# Patient Record
Sex: FEMALE | Race: WHITE | NOT HISPANIC OR LATINO | Employment: FULL TIME | URBAN - METROPOLITAN AREA
[De-identification: names, ages, dates, MRNs, and addresses within clinical notes are randomized per-mention and may not be internally consistent; named-entity substitution may affect disease eponyms.]

---

## 2018-02-09 ENCOUNTER — HOSPITAL ENCOUNTER (EMERGENCY)
Facility: HOSPITAL | Age: 23
Discharge: HOME | End: 2018-02-09
Attending: EMERGENCY MEDICINE | Admitting: EMERGENCY MEDICINE

## 2018-02-09 ENCOUNTER — APPOINTMENT (OUTPATIENT)
Dept: ULTRASOUND IMAGING | Facility: HOSPITAL | Age: 23
End: 2018-02-09

## 2018-02-09 VITALS
DIASTOLIC BLOOD PRESSURE: 75 MMHG | HEIGHT: 67 IN | BODY MASS INDEX: 44.42 KG/M2 | RESPIRATION RATE: 18 BRPM | SYSTOLIC BLOOD PRESSURE: 112 MMHG | OXYGEN SATURATION: 96 % | WEIGHT: 283 LBS | TEMPERATURE: 98 F | HEART RATE: 79 BPM

## 2018-02-09 DIAGNOSIS — R10.10 PAIN OF UPPER ABDOMEN: Primary | ICD-10-CM

## 2018-02-09 LAB
ALBUMIN SERPL-MCNC: 4.4 G/DL (ref 3.2–4.8)
ALBUMIN/GLOB SERPL: 1.8 G/DL (ref 1.5–2.5)
ALP SERPL-CCNC: 128 U/L (ref 25–100)
ALT SERPL W P-5'-P-CCNC: 29 U/L (ref 7–40)
ANION GAP SERPL CALCULATED.3IONS-SCNC: 7 MMOL/L (ref 3–11)
AST SERPL-CCNC: 26 U/L (ref 0–33)
B-HCG UR QL: NEGATIVE
BACTERIA UR QL AUTO: ABNORMAL /HPF
BASOPHILS # BLD AUTO: 0.02 10*3/MM3 (ref 0–0.2)
BASOPHILS NFR BLD AUTO: 0.2 % (ref 0–1)
BILIRUB SERPL-MCNC: 0.3 MG/DL (ref 0.3–1.2)
BILIRUB UR QL STRIP: NEGATIVE
BUN BLD-MCNC: 13 MG/DL (ref 9–23)
BUN/CREAT SERPL: 18.6 (ref 7–25)
CALCIUM SPEC-SCNC: 9.1 MG/DL (ref 8.7–10.4)
CHLORIDE SERPL-SCNC: 108 MMOL/L (ref 99–109)
CLARITY UR: ABNORMAL
CO2 SERPL-SCNC: 21 MMOL/L (ref 20–31)
COLOR UR: YELLOW
CREAT BLD-MCNC: 0.7 MG/DL (ref 0.6–1.3)
DEPRECATED RDW RBC AUTO: 40.6 FL (ref 37–54)
EOSINOPHIL # BLD AUTO: 0.16 10*3/MM3 (ref 0–0.3)
EOSINOPHIL NFR BLD AUTO: 1.5 % (ref 0–3)
ERYTHROCYTE [DISTWIDTH] IN BLOOD BY AUTOMATED COUNT: 12.8 % (ref 11.3–14.5)
GFR SERPL CREATININE-BSD FRML MDRD: 103 ML/MIN/1.73
GLOBULIN UR ELPH-MCNC: 2.5 GM/DL
GLUCOSE BLD-MCNC: 89 MG/DL (ref 70–100)
GLUCOSE UR STRIP-MCNC: NEGATIVE MG/DL
HCT VFR BLD AUTO: 42.9 % (ref 34.5–44)
HGB BLD-MCNC: 14.1 G/DL (ref 11.5–15.5)
HGB UR QL STRIP.AUTO: NEGATIVE
HOLD SPECIMEN: NORMAL
HOLD SPECIMEN: NORMAL
HYALINE CASTS UR QL AUTO: ABNORMAL /LPF
IMM GRANULOCYTES # BLD: 0.05 10*3/MM3 (ref 0–0.03)
IMM GRANULOCYTES NFR BLD: 0.5 % (ref 0–0.6)
INTERNAL NEGATIVE CONTROL: NEGATIVE
INTERNAL POSITIVE CONTROL: POSITIVE
KETONES UR QL STRIP: NEGATIVE
LEUKOCYTE ESTERASE UR QL STRIP.AUTO: NEGATIVE
LIPASE SERPL-CCNC: 33 U/L (ref 6–51)
LYMPHOCYTES # BLD AUTO: 2.37 10*3/MM3 (ref 0.6–4.8)
LYMPHOCYTES NFR BLD AUTO: 22.2 % (ref 24–44)
Lab: NORMAL
MCH RBC QN AUTO: 28.5 PG (ref 27–31)
MCHC RBC AUTO-ENTMCNC: 32.9 G/DL (ref 32–36)
MCV RBC AUTO: 86.8 FL (ref 80–99)
MONOCYTES # BLD AUTO: 0.83 10*3/MM3 (ref 0–1)
MONOCYTES NFR BLD AUTO: 7.8 % (ref 0–12)
NEUTROPHILS # BLD AUTO: 7.24 10*3/MM3 (ref 1.5–8.3)
NEUTROPHILS NFR BLD AUTO: 67.8 % (ref 41–71)
NITRITE UR QL STRIP: NEGATIVE
PH UR STRIP.AUTO: 7 [PH] (ref 5–8)
PLATELET # BLD AUTO: 261 10*3/MM3 (ref 150–450)
PMV BLD AUTO: 10.7 FL (ref 6–12)
POTASSIUM BLD-SCNC: 4.1 MMOL/L (ref 3.5–5.5)
PROT SERPL-MCNC: 6.9 G/DL (ref 5.7–8.2)
PROT UR QL STRIP: NEGATIVE
RBC # BLD AUTO: 4.94 10*6/MM3 (ref 3.89–5.14)
RBC # UR: ABNORMAL /HPF
REF LAB TEST METHOD: ABNORMAL
SODIUM BLD-SCNC: 136 MMOL/L (ref 132–146)
SP GR UR STRIP: 1.02 (ref 1–1.03)
SQUAMOUS #/AREA URNS HPF: ABNORMAL /HPF
UROBILINOGEN UR QL STRIP: ABNORMAL
WBC NRBC COR # BLD: 10.67 10*3/MM3 (ref 3.5–10.8)
WBC UR QL AUTO: ABNORMAL /HPF
WHOLE BLOOD HOLD SPECIMEN: NORMAL
WHOLE BLOOD HOLD SPECIMEN: NORMAL

## 2018-02-09 PROCEDURE — 81001 URINALYSIS AUTO W/SCOPE: CPT | Performed by: EMERGENCY MEDICINE

## 2018-02-09 PROCEDURE — 25010000002 ONDANSETRON PER 1 MG: Performed by: EMERGENCY MEDICINE

## 2018-02-09 PROCEDURE — 96361 HYDRATE IV INFUSION ADD-ON: CPT

## 2018-02-09 PROCEDURE — 85025 COMPLETE CBC W/AUTO DIFF WBC: CPT | Performed by: EMERGENCY MEDICINE

## 2018-02-09 PROCEDURE — 99284 EMERGENCY DEPT VISIT MOD MDM: CPT

## 2018-02-09 PROCEDURE — 76705 ECHO EXAM OF ABDOMEN: CPT

## 2018-02-09 PROCEDURE — 83690 ASSAY OF LIPASE: CPT | Performed by: EMERGENCY MEDICINE

## 2018-02-09 PROCEDURE — 96374 THER/PROPH/DIAG INJ IV PUSH: CPT

## 2018-02-09 PROCEDURE — 80053 COMPREHEN METABOLIC PANEL: CPT | Performed by: EMERGENCY MEDICINE

## 2018-02-09 RX ORDER — ONDANSETRON 2 MG/ML
4 INJECTION INTRAMUSCULAR; INTRAVENOUS ONCE
Status: COMPLETED | OUTPATIENT
Start: 2018-02-09 | End: 2018-02-09

## 2018-02-09 RX ORDER — SODIUM CHLORIDE 9 MG/ML
125 INJECTION, SOLUTION INTRAVENOUS CONTINUOUS
Status: DISCONTINUED | OUTPATIENT
Start: 2018-02-09 | End: 2018-02-09 | Stop reason: HOSPADM

## 2018-02-09 RX ORDER — SODIUM CHLORIDE 0.9 % (FLUSH) 0.9 %
10 SYRINGE (ML) INJECTION AS NEEDED
Status: DISCONTINUED | OUTPATIENT
Start: 2018-02-09 | End: 2018-02-09 | Stop reason: HOSPADM

## 2018-02-09 RX ORDER — CITALOPRAM 40 MG/1
40 TABLET ORAL DAILY
COMMUNITY
End: 2018-11-07

## 2018-02-09 RX ADMIN — SODIUM CHLORIDE 125 ML/HR: 9 INJECTION, SOLUTION INTRAVENOUS at 06:44

## 2018-02-09 RX ADMIN — SODIUM CHLORIDE 500 ML: 9 INJECTION, SOLUTION INTRAVENOUS at 06:13

## 2018-02-09 RX ADMIN — ONDANSETRON 4 MG: 2 INJECTION INTRAMUSCULAR; INTRAVENOUS at 06:15

## 2018-02-09 NOTE — DISCHARGE INSTRUCTIONS
Follow up with one of the [facility] physician groups below to setup primary care. If you have trouble following up, please call Sayda Torres, our transitional care nurse, at [phone].    (Dr. Shen, Dr. Arguelles, Dr. Olsen, and Dr. Vela.)  [facility], Primary Care, [phone], [address], [place] [address]    [facility], Primary Care, [phone], [address], [address] [place], [address] [facility]) [facility], Primary Care, [phone], [address] [place], [address] [facility], Primary Care, [phone], [address], [address] [place], [address]     [place] [facility], Primary Care, [phone], [address], [address] [place], [address]    [place] [address] [facility], Primary Care, [phone], [address] [place]    [place], [address] [facility], Primary Care, [phone], [address], [address] [place], [address] [facility], Primary Care, [phone], [address], [address] [place], [address] [facility]) [facility], Primary Care, [phone], [address] [place], [address] [facility], Primary Care, [phone], [address] [place], [address] [facility], Primary Care, [phone], [address], [place], [address]     [facility], Primary Care, [phone], [address] [facility] [address] [facility], [address], [facility]        Abdominal Pain, Adult  Abdominal pain can be caused by many things. Often, abdominal pain is not serious and it gets better with no treatment  or by being treated at home. However, sometimes abdominal pain is serious. Your health care provider will do a medical history and a physical exam to try to determine the cause of your abdominal pain.  Follow these instructions at home:  · Take over-the-counter and prescription medicines only as told by your health care provider. Do not take a laxative unless told by your health care provider.  · Drink enough fluid to keep your urine clear or pale yellow.  · Watch your condition for any changes.  · Keep all follow-up visits as told by your health care provider. This is important.  Contact a health care provider if:  · Your abdominal pain changes or gets worse.  · You are not hungry or you lose weight without trying.  · You are constipated or have diarrhea for more than 2-3 days.  · You have pain when you urinate or have a bowel movement.  · Your abdominal pain wakes you up at night.  · Your pain gets worse with meals, after eating, or with certain foods.  · You are throwing up and cannot keep anything down.  · You have a fever.  Get help right away if:  · Your pain does not go away as soon as your health care provider told you to expect.  · You cannot stop throwing up.  · Your pain is only in areas of the abdomen, such as the right side or the left lower portion of the abdomen.  · You have bloody or black stools, or stools that look like tar.  · You have severe pain, cramping, or bloating in your abdomen.  · You have signs of dehydration, such as:  ¨ Dark urine, very little urine, or no urine.  ¨ Cracked lips.  ¨ Dry mouth.  ¨ Sunken eyes.  ¨ Sleepiness.  ¨ Weakness.  This information is not intended to replace advice given to you by your health care provider. Make sure you discuss any questions you have with your health care provider.  Document Released: 09/27/2006 Document Revised: 07/07/2017 Document Reviewed: 05/31/2017  Elsevier Interactive Patient Education © 2017 [employer]

## 2018-02-09 NOTE — ED PROVIDER NOTES
Subjective   History of Present Illness  This 22-year-old female presents the emergency department complaints of intermittent epigastric abdominal discomfort which she is noted over the past 2 or 3 years.  The patient is noted the discomfort is brought on and worsened by eating.  She's had no vomiting but has had some loose stools with this however no diarrhea at this time.  She does have some nausea.  She's had no fever she's had no cough cold runny nose she is otherwise been well.  She denies dysuria urgency or frequency.  The patient has an IUD in place and states that she does not have her menses and as such.    Past medical history is benign other than some depression    Current medication as noted on the chart    Social history she does not smoke she drinks on a very rare occasion she denies drug abuse    Surgical history none    Menstrual history as noted  Review of Systems   Constitutional: Negative for chills and fever.   Respiratory: Negative for cough and shortness of breath.    Cardiovascular: Negative for chest pain.   Gastrointestinal: Positive for abdominal pain, diarrhea and nausea. Negative for abdominal distention, constipation and vomiting.   Genitourinary: Negative for dysuria, frequency and urgency.   All other systems reviewed and are negative.      Past Medical History:   Diagnosis Date   • Depression        No Known Allergies    History reviewed. No pertinent surgical history.    History reviewed. No pertinent family history.    Social History     Social History   • Marital status: Single     Spouse name: N/A   • Number of children: N/A   • Years of education: N/A     Social History Main Topics   • Smoking status: Never Smoker   • Smokeless tobacco: Never Used   • Alcohol use Yes      Comment: ON OCCASION   • Drug use: No   • Sexual activity: Defer     Other Topics Concern   • None     Social History Narrative   • None           Objective   Physical Exam   Constitutional: She is oriented to  person, place, and time. She appears well-developed and well-nourished. No distress.   HENT:   Head: Normocephalic and atraumatic.   Mouth/Throat: Oropharynx is clear and moist.   Eyes: Pupils are equal, round, and reactive to light. No scleral icterus.   Neck: Neck supple. No thyromegaly present.   Cardiovascular: Normal rate, regular rhythm and normal heart sounds.    Pulmonary/Chest: Effort normal and breath sounds normal. No respiratory distress.   Abdominal: Soft. Bowel sounds are normal. She exhibits no distension. There is tenderness. There is no rebound and no guarding.   Musculoskeletal: She exhibits no edema.   Lymphadenopathy:     She has no cervical adenopathy.   Neurological: She is alert and oriented to person, place, and time. No cranial nerve deficit. Coordination normal.   Skin: She is not diaphoretic.   Psychiatric: She has a normal mood and affect. Her behavior is normal.   Nursing note and vitals reviewed.   abdominal exam notes epigastric to right upper quadrant tenderness with palpation.  No masses are palpated.  Bowel sounds are present.  There is no CVA or flank tenderness.  There is some voluntary guarding but no rebound.  Laboratory evaluation notes a normal set of chemistries that the exception of a mildly elevated alkaline phosphatase.  Lipase is normal at 33.  A white count normal at 10,000 with a normal H&H.  Urinalysis shows no evidence for an acute infectious process.  An ultrasound of the gallbladder is performed and formally read as demonstrating no evidence for gallstones or cholecystitis.  There is fatty infiltration of liver.    Assessment abdominal pain    Plan at this point I feel that follow-up with the primary care physician is most appropriate with consideration for further evaluation for her pain.  I have reviewed the patient's lab laboratory studies as well as her ultrasound results with her and explained that the next step would likely be a HIDA scan and further  evaluation for her primary Physician Standpoint.  I Spoke with the Patient about the Need to Limit Greasy or Fatty Foods in Her Diet (Last Night's Dinner Was a Hamburger and [place]).  Patient Indicates Understanding.  Procedures         ED Course  ED Course                  MDM    Final diagnoses:   Pain of upper abdomen            Viral Cody MD  02/09/18 2125

## 2018-04-01 ENCOUNTER — APPOINTMENT (OUTPATIENT)
Dept: CT IMAGING | Facility: HOSPITAL | Age: 23
End: 2018-04-01

## 2018-04-01 ENCOUNTER — HOSPITAL ENCOUNTER (EMERGENCY)
Facility: HOSPITAL | Age: 23
Discharge: HOME | End: 2018-04-02
Attending: EMERGENCY MEDICINE | Admitting: EMERGENCY MEDICINE

## 2018-04-01 DIAGNOSIS — R10.31 RLQ ABDOMINAL PAIN: Primary | ICD-10-CM

## 2018-04-01 LAB
ALBUMIN SERPL-MCNC: 4.4 G/DL (ref 3.2–4.8)
ALBUMIN/GLOB SERPL: 1.5 G/DL (ref 1.5–2.5)
ALP SERPL-CCNC: 123 U/L (ref 25–100)
ALT SERPL W P-5'-P-CCNC: 30 U/L (ref 7–40)
ANION GAP SERPL CALCULATED.3IONS-SCNC: 8 MMOL/L (ref 3–11)
AST SERPL-CCNC: 27 U/L (ref 0–33)
B-HCG UR QL: NEGATIVE
BASOPHILS # BLD AUTO: 0.03 10*3/MM3 (ref 0–0.2)
BASOPHILS NFR BLD AUTO: 0.2 % (ref 0–1)
BILIRUB SERPL-MCNC: 0.7 MG/DL (ref 0.3–1.2)
BILIRUB UR QL STRIP: NEGATIVE
BUN BLD-MCNC: 10 MG/DL (ref 9–23)
BUN/CREAT SERPL: 14.3 (ref 7–25)
CALCIUM SPEC-SCNC: 9.4 MG/DL (ref 8.7–10.4)
CHLORIDE SERPL-SCNC: 111 MMOL/L (ref 99–109)
CLARITY UR: CLEAR
CO2 SERPL-SCNC: 21 MMOL/L (ref 20–31)
COLOR UR: YELLOW
CREAT BLD-MCNC: 0.7 MG/DL (ref 0.6–1.3)
DEPRECATED RDW RBC AUTO: 39.3 FL (ref 37–54)
EOSINOPHIL # BLD AUTO: 0.15 10*3/MM3 (ref 0–0.3)
EOSINOPHIL NFR BLD AUTO: 1.1 % (ref 0–3)
ERYTHROCYTE [DISTWIDTH] IN BLOOD BY AUTOMATED COUNT: 12.5 % (ref 11.3–14.5)
GFR SERPL CREATININE-BSD FRML MDRD: 103 ML/MIN/1.73
GLOBULIN UR ELPH-MCNC: 2.9 GM/DL
GLUCOSE BLD-MCNC: 83 MG/DL (ref 70–100)
GLUCOSE UR STRIP-MCNC: NEGATIVE MG/DL
HCT VFR BLD AUTO: 39.8 % (ref 34.5–44)
HGB BLD-MCNC: 13.3 G/DL (ref 11.5–15.5)
HGB UR QL STRIP.AUTO: NEGATIVE
HOLD SPECIMEN: NORMAL
HOLD SPECIMEN: NORMAL
IMM GRANULOCYTES # BLD: 0.04 10*3/MM3 (ref 0–0.03)
IMM GRANULOCYTES NFR BLD: 0.3 % (ref 0–0.6)
INTERNAL NEGATIVE CONTROL: NORMAL
INTERNAL POSITIVE CONTROL: NORMAL
KETONES UR QL STRIP: NEGATIVE
LEUKOCYTE ESTERASE UR QL STRIP.AUTO: NEGATIVE
LIPASE SERPL-CCNC: 37 U/L (ref 6–51)
LYMPHOCYTES # BLD AUTO: 2.88 10*3/MM3 (ref 0.6–4.8)
LYMPHOCYTES NFR BLD AUTO: 21.3 % (ref 24–44)
Lab: NORMAL
MCH RBC QN AUTO: 28.8 PG (ref 27–31)
MCHC RBC AUTO-ENTMCNC: 33.4 G/DL (ref 32–36)
MCV RBC AUTO: 86.1 FL (ref 80–99)
MONOCYTES # BLD AUTO: 0.92 10*3/MM3 (ref 0–1)
MONOCYTES NFR BLD AUTO: 6.8 % (ref 0–12)
NEUTROPHILS # BLD AUTO: 9.51 10*3/MM3 (ref 1.5–8.3)
NEUTROPHILS NFR BLD AUTO: 70.3 % (ref 41–71)
NITRITE UR QL STRIP: NEGATIVE
PH UR STRIP.AUTO: 5.5 [PH] (ref 5–8)
PLATELET # BLD AUTO: 276 10*3/MM3 (ref 150–450)
PMV BLD AUTO: 10.4 FL (ref 6–12)
POTASSIUM BLD-SCNC: 4.1 MMOL/L (ref 3.5–5.5)
PROT SERPL-MCNC: 7.3 G/DL (ref 5.7–8.2)
PROT UR QL STRIP: NEGATIVE
RBC # BLD AUTO: 4.62 10*6/MM3 (ref 3.89–5.14)
SODIUM BLD-SCNC: 140 MMOL/L (ref 132–146)
SP GR UR STRIP: 1.03 (ref 1–1.03)
UROBILINOGEN UR QL STRIP: NORMAL
WBC NRBC COR # BLD: 13.53 10*3/MM3 (ref 3.5–10.8)
WHOLE BLOOD HOLD SPECIMEN: NORMAL
WHOLE BLOOD HOLD SPECIMEN: NORMAL

## 2018-04-01 PROCEDURE — 80053 COMPREHEN METABOLIC PANEL: CPT | Performed by: EMERGENCY MEDICINE

## 2018-04-01 PROCEDURE — 85025 COMPLETE CBC W/AUTO DIFF WBC: CPT | Performed by: EMERGENCY MEDICINE

## 2018-04-01 PROCEDURE — 81003 URINALYSIS AUTO W/O SCOPE: CPT | Performed by: EMERGENCY MEDICINE

## 2018-04-01 PROCEDURE — 74176 CT ABD & PELVIS W/O CONTRAST: CPT

## 2018-04-01 PROCEDURE — 99284 EMERGENCY DEPT VISIT MOD MDM: CPT

## 2018-04-01 PROCEDURE — 96374 THER/PROPH/DIAG INJ IV PUSH: CPT

## 2018-04-01 PROCEDURE — 83690 ASSAY OF LIPASE: CPT | Performed by: EMERGENCY MEDICINE

## 2018-04-01 PROCEDURE — 25010000002 KETOROLAC TROMETHAMINE PER 15 MG: Performed by: EMERGENCY MEDICINE

## 2018-04-01 PROCEDURE — 25010000002 MORPHINE SULFATE (PF) 2 MG/ML SOLUTION: Performed by: EMERGENCY MEDICINE

## 2018-04-01 PROCEDURE — 25010000002 ONDANSETRON PER 1 MG: Performed by: EMERGENCY MEDICINE

## 2018-04-01 PROCEDURE — 96375 TX/PRO/DX INJ NEW DRUG ADDON: CPT

## 2018-04-01 RX ORDER — SODIUM CHLORIDE 0.9 % (FLUSH) 0.9 %
10 SYRINGE (ML) INJECTION AS NEEDED
Status: DISCONTINUED | OUTPATIENT
Start: 2018-04-01 | End: 2018-04-02 | Stop reason: HOSPADM

## 2018-04-01 RX ORDER — TRAMADOL HYDROCHLORIDE 50 MG/1
50 TABLET ORAL EVERY 6 HOURS PRN
Qty: 15 TABLET | Refills: 0 | Status: SHIPPED | OUTPATIENT
Start: 2018-04-01 | End: 2018-11-07

## 2018-04-01 RX ORDER — ONDANSETRON 2 MG/ML
4 INJECTION INTRAMUSCULAR; INTRAVENOUS ONCE
Status: COMPLETED | OUTPATIENT
Start: 2018-04-01 | End: 2018-04-01

## 2018-04-01 RX ORDER — MORPHINE SULFATE 2 MG/ML
2 INJECTION, SOLUTION INTRAMUSCULAR; INTRAVENOUS ONCE
Status: COMPLETED | OUTPATIENT
Start: 2018-04-01 | End: 2018-04-01

## 2018-04-01 RX ORDER — KETOROLAC TROMETHAMINE 30 MG/ML
30 INJECTION, SOLUTION INTRAMUSCULAR; INTRAVENOUS ONCE
Status: COMPLETED | OUTPATIENT
Start: 2018-04-01 | End: 2018-04-01

## 2018-04-01 RX ORDER — DICLOFENAC SODIUM 75 MG/1
75 TABLET, DELAYED RELEASE ORAL 2 TIMES DAILY
Qty: 14 TABLET | Refills: 0 | Status: SHIPPED | OUTPATIENT
Start: 2018-04-01 | End: 2018-11-07

## 2018-04-01 RX ADMIN — ONDANSETRON 4 MG: 2 INJECTION INTRAMUSCULAR; INTRAVENOUS at 22:40

## 2018-04-01 RX ADMIN — KETOROLAC TROMETHAMINE 30 MG: 30 INJECTION, SOLUTION INTRAMUSCULAR; INTRAVENOUS at 22:42

## 2018-04-01 RX ADMIN — MORPHINE SULFATE 2 MG: 2 INJECTION, SOLUTION INTRAMUSCULAR; INTRAVENOUS at 22:46

## 2018-04-02 VITALS
DIASTOLIC BLOOD PRESSURE: 68 MMHG | BODY MASS INDEX: 44.89 KG/M2 | RESPIRATION RATE: 18 BRPM | HEIGHT: 67 IN | SYSTOLIC BLOOD PRESSURE: 106 MMHG | TEMPERATURE: 98.6 F | HEART RATE: 76 BPM | WEIGHT: 286 LBS | OXYGEN SATURATION: 98 %

## 2018-04-02 NOTE — ED PROVIDER NOTES
Subjective   Karma Morrissey is a 22 y.o.female who presents to the ED with c/o abd pain. She reports that she suddenly developed severe sharp pain located at her RLQ abdominal region radiating to her back. She notes that she has had similar episodes intermittently for the last 2 years. She states that she has not had her episodes assessed secondary to her insurance problems. She also complains of difficulty urinating but denies any other complaints at this time. She denies any hx of surgical procedures performed on her abd.        History provided by:  Patient  Abdominal Pain   Pain location:  RLQ  Pain quality: sharp and stabbing    Pain radiates to:  Back  Pain severity:  Moderate  Onset quality:  Sudden  Timing:  Constant  Progression:  Worsening  Chronicity:  Recurrent  Relieved by:  None tried  Worsened by:  Nothing  Ineffective treatments:  None tried      Review of Systems   Gastrointestinal: Positive for abdominal pain.   Genitourinary: Positive for difficulty urinating.   All other systems reviewed and are negative.      Past Medical History:   Diagnosis Date   • Depression        No Known Allergies    History reviewed. No pertinent surgical history.    History reviewed. No pertinent family history.    Social History     Social History   • Marital status: Single     Social History Main Topics   • Smoking status: Never Smoker   • Smokeless tobacco: Never Used   • Alcohol use Yes      Comment: ON OCCASION   • Drug use: No   • Sexual activity: Defer     Other Topics Concern   • Not on file         Objective   Physical Exam   Constitutional: She is oriented to person, place, and time. She appears well-developed and well-nourished. No distress.   HENT:   Head: Normocephalic and atraumatic.   Nose: Nose normal.   Eyes: Conjunctivae are normal. No scleral icterus.   Neck: Normal range of motion. Neck supple.   Cardiovascular: Normal rate, regular rhythm and normal heart sounds.    No murmur heard.  Pulmonary/Chest:  Effort normal and breath sounds normal. No respiratory distress.   Abdominal: Soft. Bowel sounds are normal. There is tenderness (Mild RLQ tenderness.). There is no rebound and no guarding.   Neurological: She is alert and oriented to person, place, and time.   Skin: Skin is warm and dry.   Psychiatric: She has a normal mood and affect. Her behavior is normal.   Nursing note and vitals reviewed.      Procedures         ED Course  ED Course     No acute abd on exam. Labs normal, UA negative, CT negative.  Pt had negatuve US at recent visit.  Patient stable on serial rechecks.  Pain better after meds.  Discussed findings, concerns, plan of care, expected course, reasons to return and followup.  She will require a PCP and ongoing evaluation to figure out this pain that she has had for 2 years.                  MDM  Number of Diagnoses or Management Options  RLQ abdominal pain:      Amount and/or Complexity of Data Reviewed  Clinical lab tests: ordered and reviewed  Tests in the radiology section of CPT®: ordered and reviewed  Decide to obtain previous medical records or to obtain history from someone other than the patient: yes  Independent visualization of images, tracings, or specimens: yes        Final diagnoses:   RLQ abdominal pain       Documentation assistance provided by kevin Mckeon.  Information recorded by the [email or web address] was done at my direction and has been verified and validated by me.     Steven Mckeon  04/01/18 4157       Casimiro Sherwood MD  04/01/18 4153

## 2018-04-02 NOTE — DISCHARGE INSTRUCTIONS
Follow up with one of the physician centers below to setup primary care.    [facility], [phone], [address], [address], [place] [address]    [facility], [phone], [address], [address] [place] [address], [phone], [address] [place] [address] [address] [place] [address];     [facility], [phone], [address] [place] [address]

## 2018-11-07 PROBLEM — S56.419A: Status: ACTIVE | Noted: 2018-11-07

## 2020-01-09 ENCOUNTER — TRANSCRIBE ORDERS (OUTPATIENT)
Dept: CARDIOLOGY | Facility: HOSPITAL | Age: 25
End: 2020-01-09

## 2020-01-09 ENCOUNTER — HOSPITAL ENCOUNTER (OUTPATIENT)
Dept: CARDIOLOGY | Facility: HOSPITAL | Age: 25
Discharge: HOME | End: 2020-01-09
Admitting: NURSE PRACTITIONER

## 2020-01-09 DIAGNOSIS — Z51.81 ENCOUNTER FOR THERAPEUTIC DRUG MONITORING: ICD-10-CM

## 2020-01-09 DIAGNOSIS — Z51.81 ENCOUNTER FOR THERAPEUTIC DRUG MONITORING: Primary | ICD-10-CM

## 2020-01-09 PROCEDURE — 93010 ELECTROCARDIOGRAM REPORT: CPT | Performed by: INTERNAL MEDICINE

## 2020-01-09 PROCEDURE — 93005 ELECTROCARDIOGRAM TRACING: CPT | Performed by: NURSE PRACTITIONER

## 2020-01-31 ENCOUNTER — APPOINTMENT (OUTPATIENT)
Dept: CT IMAGING | Facility: HOSPITAL | Age: 25
End: 2020-01-31

## 2020-01-31 ENCOUNTER — HOSPITAL ENCOUNTER (EMERGENCY)
Facility: HOSPITAL | Age: 25
Discharge: HOME | End: 2020-01-31
Attending: EMERGENCY MEDICINE | Admitting: EMERGENCY MEDICINE

## 2020-01-31 VITALS
DIASTOLIC BLOOD PRESSURE: 76 MMHG | SYSTOLIC BLOOD PRESSURE: 122 MMHG | RESPIRATION RATE: 18 BRPM | WEIGHT: 293 LBS | HEIGHT: 66 IN | HEART RATE: 70 BPM | TEMPERATURE: 99.2 F | OXYGEN SATURATION: 98 % | BODY MASS INDEX: 47.09 KG/M2

## 2020-01-31 DIAGNOSIS — H81.10 BENIGN PAROXYSMAL POSITIONAL VERTIGO, UNSPECIFIED LATERALITY: Primary | ICD-10-CM

## 2020-01-31 LAB
ALBUMIN SERPL-MCNC: 4.3 G/DL (ref 3.5–5.2)
ALBUMIN/GLOB SERPL: 1.3 G/DL
ALP SERPL-CCNC: 125 U/L (ref 39–117)
ALT SERPL W P-5'-P-CCNC: 27 U/L (ref 1–33)
ANION GAP SERPL CALCULATED.3IONS-SCNC: 13 MMOL/L (ref 5–15)
AST SERPL-CCNC: 19 U/L (ref 1–32)
B-HCG UR QL: NEGATIVE
BACTERIA UR QL AUTO: ABNORMAL /HPF
BASOPHILS # BLD AUTO: 0.04 10*3/MM3 (ref 0–0.2)
BASOPHILS NFR BLD AUTO: 0.4 % (ref 0–1.5)
BILIRUB SERPL-MCNC: 0.6 MG/DL (ref 0.2–1.2)
BILIRUB UR QL STRIP: NEGATIVE
BUN BLD-MCNC: 9 MG/DL (ref 6–20)
BUN/CREAT SERPL: 12.5 (ref 7–25)
CALCIUM SPEC-SCNC: 9.1 MG/DL (ref 8.6–10.5)
CHLORIDE SERPL-SCNC: 100 MMOL/L (ref 98–107)
CLARITY UR: ABNORMAL
CO2 SERPL-SCNC: 23 MMOL/L (ref 22–29)
COLOR UR: YELLOW
CREAT BLD-MCNC: 0.72 MG/DL (ref 0.57–1)
DEPRECATED RDW RBC AUTO: 39.4 FL (ref 37–54)
EOSINOPHIL # BLD AUTO: 0.16 10*3/MM3 (ref 0–0.4)
EOSINOPHIL NFR BLD AUTO: 1.5 % (ref 0.3–6.2)
ERYTHROCYTE [DISTWIDTH] IN BLOOD BY AUTOMATED COUNT: 12.1 % (ref 12.3–15.4)
GFR SERPL CREATININE-BSD FRML MDRD: 98 ML/MIN/1.73
GLOBULIN UR ELPH-MCNC: 3.2 GM/DL
GLUCOSE BLD-MCNC: 91 MG/DL (ref 65–99)
GLUCOSE UR STRIP-MCNC: NEGATIVE MG/DL
HCT VFR BLD AUTO: 41.2 % (ref 34–46.6)
HGB BLD-MCNC: 13.6 G/DL (ref 12–15.9)
HGB UR QL STRIP.AUTO: ABNORMAL
HOLD SPECIMEN: NORMAL
HOLD SPECIMEN: NORMAL
HYALINE CASTS UR QL AUTO: ABNORMAL /LPF
IMM GRANULOCYTES # BLD AUTO: 0.07 10*3/MM3 (ref 0–0.05)
IMM GRANULOCYTES NFR BLD AUTO: 0.7 % (ref 0–0.5)
KETONES UR QL STRIP: ABNORMAL
LEUKOCYTE ESTERASE UR QL STRIP.AUTO: NEGATIVE
LYMPHOCYTES # BLD AUTO: 1.83 10*3/MM3 (ref 0.7–3.1)
LYMPHOCYTES NFR BLD AUTO: 17.3 % (ref 19.6–45.3)
MCH RBC QN AUTO: 29.4 PG (ref 26.6–33)
MCHC RBC AUTO-ENTMCNC: 33 G/DL (ref 31.5–35.7)
MCV RBC AUTO: 89 FL (ref 79–97)
MONOCYTES # BLD AUTO: 0.72 10*3/MM3 (ref 0.1–0.9)
MONOCYTES NFR BLD AUTO: 6.8 % (ref 5–12)
NEUTROPHILS # BLD AUTO: 7.78 10*3/MM3 (ref 1.7–7)
NEUTROPHILS NFR BLD AUTO: 73.3 % (ref 42.7–76)
NITRITE UR QL STRIP: NEGATIVE
NRBC BLD AUTO-RTO: 0 /100 WBC (ref 0–0.2)
PH UR STRIP.AUTO: 5.5 [PH] (ref 5–8)
PLATELET # BLD AUTO: 289 10*3/MM3 (ref 140–450)
PMV BLD AUTO: 10.3 FL (ref 6–12)
POTASSIUM BLD-SCNC: 4.2 MMOL/L (ref 3.5–5.2)
PROT SERPL-MCNC: 7.5 G/DL (ref 6–8.5)
PROT UR QL STRIP: NEGATIVE
RBC # BLD AUTO: 4.63 10*6/MM3 (ref 3.77–5.28)
RBC # UR: ABNORMAL /HPF
REF LAB TEST METHOD: ABNORMAL
SODIUM BLD-SCNC: 136 MMOL/L (ref 136–145)
SP GR UR STRIP: 1.03 (ref 1–1.03)
SQUAMOUS #/AREA URNS HPF: ABNORMAL /HPF
UROBILINOGEN UR QL STRIP: ABNORMAL
WBC NRBC COR # BLD: 10.6 10*3/MM3 (ref 3.4–10.8)
WBC UR QL AUTO: ABNORMAL /HPF
WHOLE BLOOD HOLD SPECIMEN: NORMAL
WHOLE BLOOD HOLD SPECIMEN: NORMAL

## 2020-01-31 PROCEDURE — 81025 URINE PREGNANCY TEST: CPT | Performed by: EMERGENCY MEDICINE

## 2020-01-31 PROCEDURE — 99284 EMERGENCY DEPT VISIT MOD MDM: CPT

## 2020-01-31 PROCEDURE — 80053 COMPREHEN METABOLIC PANEL: CPT | Performed by: NURSE PRACTITIONER

## 2020-01-31 PROCEDURE — 70450 CT HEAD/BRAIN W/O DYE: CPT

## 2020-01-31 PROCEDURE — 85025 COMPLETE CBC W/AUTO DIFF WBC: CPT | Performed by: NURSE PRACTITIONER

## 2020-01-31 PROCEDURE — 81001 URINALYSIS AUTO W/SCOPE: CPT | Performed by: EMERGENCY MEDICINE

## 2020-01-31 RX ORDER — MECLIZINE HYDROCHLORIDE 25 MG/1
25 TABLET ORAL 3 TIMES DAILY PRN
Qty: 20 TABLET | Refills: 0 | Status: SHIPPED | OUTPATIENT
Start: 2020-01-31 | End: 2020-06-10 | Stop reason: SDUPTHER

## 2020-01-31 RX ORDER — MECLIZINE HYDROCHLORIDE 25 MG/1
25 TABLET ORAL ONCE
Status: COMPLETED | OUTPATIENT
Start: 2020-01-31 | End: 2020-01-31

## 2020-01-31 RX ADMIN — MECLIZINE HYDROCHLORIDE 25 MG: 25 TABLET ORAL at 14:11

## 2020-06-10 PROCEDURE — 85025 COMPLETE CBC W/AUTO DIFF WBC: CPT | Performed by: PERSONAL EMERGENCY RESPONSE ATTENDANT

## 2020-06-10 PROCEDURE — U0003 INFECTIOUS AGENT DETECTION BY NUCLEIC ACID (DNA OR RNA); SEVERE ACUTE RESPIRATORY SYNDROME CORONAVIRUS 2 (SARS-COV-2) (CORONAVIRUS DISEASE [COVID-19]), AMPLIFIED PROBE TECHNIQUE, MAKING USE OF HIGH THROUGHPUT TECHNOLOGIES AS DESCRIBED BY CMS-2020-01-R: HCPCS | Performed by: PERSONAL EMERGENCY RESPONSE ATTENDANT

## 2020-06-10 PROCEDURE — 80053 COMPREHEN METABOLIC PANEL: CPT | Performed by: PERSONAL EMERGENCY RESPONSE ATTENDANT

## 2020-06-12 ENCOUNTER — TELEPHONE (OUTPATIENT)
Dept: URGENT CARE | Facility: CLINIC | Age: 25
End: 2020-06-12

## 2020-06-13 ENCOUNTER — TELEPHONE (OUTPATIENT)
Dept: URGENT CARE | Facility: CLINIC | Age: 25
End: 2020-06-13

## 2020-07-15 ENCOUNTER — OFFICE VISIT (OUTPATIENT)
Dept: FAMILY MEDICINE CLINIC | Facility: CLINIC | Age: 25
End: 2020-07-15

## 2020-07-15 VITALS
BODY MASS INDEX: 45.99 KG/M2 | OXYGEN SATURATION: 98 % | HEIGHT: 67 IN | TEMPERATURE: 98.2 F | SYSTOLIC BLOOD PRESSURE: 112 MMHG | DIASTOLIC BLOOD PRESSURE: 88 MMHG | HEART RATE: 97 BPM | WEIGHT: 293 LBS | RESPIRATION RATE: 16 BRPM

## 2020-07-15 DIAGNOSIS — R55 VASOVAGAL NEAR-SYNCOPE: ICD-10-CM

## 2020-07-15 DIAGNOSIS — N92.6 IRREGULAR MENSES: ICD-10-CM

## 2020-07-15 DIAGNOSIS — E66.01 MORBIDLY OBESE (HCC): ICD-10-CM

## 2020-07-15 DIAGNOSIS — K21.9 GASTROESOPHAGEAL REFLUX DISEASE, ESOPHAGITIS PRESENCE NOT SPECIFIED: ICD-10-CM

## 2020-07-15 DIAGNOSIS — Z83.3 FAMILY HISTORY OF DIABETES MELLITUS: ICD-10-CM

## 2020-07-15 DIAGNOSIS — Z76.89 ENCOUNTER TO ESTABLISH CARE: Primary | ICD-10-CM

## 2020-07-15 PROBLEM — F31.9 BIPOLAR DISORDER: Status: ACTIVE | Noted: 2020-07-15

## 2020-07-15 PROBLEM — F41.9 ANXIETY: Status: ACTIVE | Noted: 2020-07-15

## 2020-07-15 PROBLEM — F32.A DEPRESSION: Status: ACTIVE | Noted: 2020-07-15

## 2020-07-15 LAB
B-HCG UR QL: NEGATIVE
BILIRUB BLD-MCNC: NEGATIVE MG/DL
CLARITY, POC: ABNORMAL
COLOR UR: YELLOW
GLUCOSE UR STRIP-MCNC: NEGATIVE MG/DL
HBA1C MFR BLD: 5.37 % (ref 4.8–5.6)
INTERNAL NEGATIVE CONTROL: NEGATIVE
INTERNAL POSITIVE CONTROL: POSITIVE
KETONES UR QL: NEGATIVE
LEUKOCYTE EST, POC: NEGATIVE
Lab: NORMAL
NITRITE UR-MCNC: NEGATIVE MG/ML
PH UR: 5.5 [PH] (ref 5–8)
PROT UR STRIP-MCNC: NEGATIVE MG/DL
RBC # UR STRIP: NEGATIVE /UL
SP GR UR: 1.03 (ref 1–1.03)
UROBILINOGEN UR QL: NORMAL

## 2020-07-15 PROCEDURE — 99214 OFFICE O/P EST MOD 30 MIN: CPT | Performed by: NURSE PRACTITIONER

## 2020-07-15 PROCEDURE — 83525 ASSAY OF INSULIN: CPT | Performed by: NURSE PRACTITIONER

## 2020-07-15 PROCEDURE — 83036 HEMOGLOBIN GLYCOSYLATED A1C: CPT | Performed by: NURSE PRACTITIONER

## 2020-07-15 PROCEDURE — 84681 ASSAY OF C-PEPTIDE: CPT | Performed by: NURSE PRACTITIONER

## 2020-07-15 PROCEDURE — 81025 URINE PREGNANCY TEST: CPT | Performed by: NURSE PRACTITIONER

## 2020-07-15 PROCEDURE — 84443 ASSAY THYROID STIM HORMONE: CPT | Performed by: NURSE PRACTITIONER

## 2020-07-15 RX ORDER — LAMOTRIGINE 150 MG/1
TABLET ORAL DAILY
COMMUNITY
Start: 2020-06-22 | End: 2021-02-03

## 2020-07-15 RX ORDER — NORETHINDRONE ACETATE AND ETHINYL ESTRADIOL 1.5-30(21)
KIT ORAL
COMMUNITY
Start: 2020-06-20 | End: 2020-07-15

## 2020-07-15 RX ORDER — PANTOPRAZOLE SODIUM 40 MG/1
40 TABLET, DELAYED RELEASE ORAL DAILY
Qty: 30 TABLET | Refills: 2 | Status: SHIPPED | OUTPATIENT
Start: 2020-07-15 | End: 2020-09-14

## 2020-07-15 NOTE — PROGRESS NOTES
Subjective   Karma Morrissey is a 24 y.o. female.     Dizziness   This is a recurrent problem. The current episode started more than 1 year ago. The problem occurs intermittently. The problem has been waxing and waning. Associated symptoms include headaches. Pertinent negatives include no abdominal pain, arthralgias, change in bowel habit, chills, congestion, coughing, diaphoresis, fatigue, fever, myalgias, nausea, numbness, rash, sore throat, swollen glands, urinary symptoms, vomiting or weakness. Exacerbated by: stress/anxiety, not eating, exertion. Treatments tried: eating, rest. The treatment provided mild relief.       The following portions of the patient's history were reviewed and updated as appropriate: allergies, current medications, past family history, past medical history, past social history, past surgical history and problem list.    Allergies as of 07/15/2020   • (No Known Allergies)       Current Outpatient Medications on File Prior to Visit   Medication Sig   • lamoTRIgine (LaMICtal) 150 MG tablet    • traZODone (DESYREL) 50 MG tablet    • ziprasidone (GEODON) 40 MG capsule    • [DISCONTINUED] BLISOVI FE 1.5/30 1.5-30 MG-MCG tablet      No current facility-administered medications on file prior to visit.        Review of Systems   Constitutional: Negative for activity change, appetite change, chills, diaphoresis, fatigue, fever and unexpected weight change.   HENT: Negative for congestion, ear discharge, ear pain, facial swelling, postnasal drip, rhinorrhea, sinus pain, sore throat, trouble swallowing and voice change.    Respiratory: Negative.  Negative for cough.    Cardiovascular: Negative.    Gastrointestinal: Negative for abdominal pain, change in bowel habit, diarrhea, nausea and vomiting.   Genitourinary: Positive for menstrual problem (periods irregular). Negative for dysuria and flank pain.   Musculoskeletal: Negative for arthralgias and myalgias.   Skin: Negative for rash.   Neurological:  "Positive for dizziness, syncope (near syncope), light-headedness and headaches. Negative for tremors, seizures, facial asymmetry, speech difficulty, weakness and numbness.   Psychiatric/Behavioral:        Patient has a history of anxiety, depression and bipolar disorder. She reports that she is seeing \"Ariadna\" at [facility] for management.       Objective   Physical Exam   Constitutional: She is oriented to person, place, and time. Vital signs are normal. She appears well-developed and well-nourished. She is cooperative.  Non-toxic appearance. She does not have a sickly appearance. She does not appear ill. No distress.   Morbidly obese   HENT:   Head: Normocephalic and atraumatic.   Right Ear: External ear normal.   Left Ear: External ear normal.   Nose: Nose normal.   Mouth/Throat: Oropharynx is clear and moist.   Eyes: Pupils are equal, round, and reactive to light. No scleral icterus.   Neck: Normal range of motion. Neck supple. No thyromegaly present.   Cardiovascular: Normal rate, regular rhythm, S1 normal, S2 normal and normal heart sounds.   No murmur heard.  Pulmonary/Chest: Effort normal and breath sounds normal. No respiratory distress.   Abdominal: Soft. She exhibits no distension. There is no tenderness.   Lymphadenopathy:     She has no cervical adenopathy.   Neurological: She is alert and oriented to person, place, and time.   Skin: Skin is warm, dry and intact. No rash noted. She is not diaphoretic.   Psychiatric: Her speech is normal and behavior is normal. Judgment and thought content normal. Her mood appears not anxious. Her affect is not angry, not labile and not inappropriate. She is not actively hallucinating. Cognition and memory are normal. She does not exhibit a depressed mood.   Flat affect   Vitals reviewed.    Vitals:    07/15/20 1027   BP: 112/88   Pulse: 97   Resp: 16   Temp: 98.2 °F (36.8 °C)   SpO2: 98%     Body mass index is 49.05 kg/m².    Assessment/Plan   Karma was " seen today for establish care.    Diagnoses and all orders for this visit:    Encounter to establish care  -     POC Urinalysis Dipstick, Automated    Brief Urine Lab Results  (Last result in the past 365 days)      Color   Clarity   Blood   Leuk Est   Nitrite   Protein   CREAT   Urine HCG        07/15/20 1134 Yellow Cloudy Negative Negative Negative Negative             Vasovagal near-syncope  -     TSH  -     Hemoglobin A1c  -     Ambulatory Referral to [facility] [place] [facility]    Gastroesophageal reflux disease, esophagitis presence not specified  -     pantoprazole (PROTONIX) 40 MG EC tablet; Take 1 tablet by mouth Daily.    Irregular menses  -     TSH  -     POC Pregnancy, Urine    Morbidly obese (CMS/HCC)  -     Insulin, Total  -     C-Peptide    Family history of diabetes mellitus  -     Hemoglobin A1c  -     Insulin, Total  -     C-Peptide     Discussed the nature of the medical condition(s) risks, complications, implications, management, safe and proper use of medications. Encouraged medication compliance, and keeping scheduled follow up appointments with me and any other providers.     Laboratory testing ordered today. Further recommendations after lab evaluation.

## 2020-07-15 NOTE — PATIENT INSTRUCTIONS
Near-Syncope  Near-syncope is when you suddenly feel like you might pass out (faint), but you do not actually lose consciousness. This may also be referred to as presyncope. During an episode of near-syncope, you may:  · Feel dizzy, weak, or light-headed.  · Feel nauseous.  · See all white or all black in your field of vision, or see spots.  · Have cold, clammy skin.  This condition is caused by a sudden decrease in blood flow to the brain. This decrease can result from various causes, but most of those causes are not dangerous. However, near-syncope may be a sign of a serious medical problem, so it is important to seek medical care.  Follow these instructions at home:  Medicines  · Take over-the-counter and prescription medicines only as told by your health care provider.  · If you are taking blood pressure or heart medicine, get up slowly and take several minutes to sit and then stand. This can reduce dizziness.  General instructions  · Pay attention to any changes in your symptoms.  · Talk with your health care provider about your symptoms. You may need to have testing to understand the cause of your near-syncope.  · If you start to feel like you might faint, lie down right away and raise (elevate) your feet above the level of your heart. Breathe deeply and steadily. Wait until all of the symptoms have passed.  · Have someone stay with you until you feel stable.  · Do not drive, use machinery, or play sports until your health care provider says it is okay.  · Drink enough fluid to keep your urine pale yellow.  · Keep all follow-up visits as told by your health care provider. This is important.  Get help right away if you:  · Have a seizure.  · Have unusual pain in your chest, abdomen, or back.  · Faint once or repeatedly.  · Have a severe headache.  · Are bleeding from your mouth or rectum, or you have black or tarry stool.  · Have a very fast or irregular heartbeat (palpitations).  · Are confused.  · Have  trouble walking.  · Have severe weakness.  · Have vision problems.  These symptoms may represent a serious problem that is an emergency. Do not wait to see if your symptoms will go away. Get medical help right away. Call your local emergency services (911 in the [place] Do not drive yourself to the hospital.  Summary  · Near-syncope is when you suddenly feel like you might pass out (faint), but you do not actually lose consciousness.  · This condition is caused by a sudden decrease in blood flow to the brain. This decrease can result from various causes, but most of those causes are not dangerous.  · Near-syncope may be a sign of a serious medical problem, so it is important to seek medical care.  This information is not intended to replace advice given to you by your health care provider. Make sure you discuss any questions you have with your health care provider.  Document Released: 12/18/2006 Document Revised: 04/10/2020 Document Reviewed: 11/06/2019  [employer] Patient Education © 2020 [employer]    Gastroesophageal Reflux Disease, Adult  Gastroesophageal reflux (BRIDGET) happens when acid from the stomach flows up into the tube that connects the mouth and the stomach (esophagus). Normally, food travels down the esophagus and stays in the stomach to be digested. However, when a person has BRIDGET, food and stomach acid sometimes move back up into the esophagus. If this becomes a more serious problem, the person may be diagnosed with a disease called gastroesophageal reflux disease (GERD). GERD occurs when the reflux:  · Happens often.  · Causes frequent or severe symptoms.  · Causes problems such as damage to the esophagus.  When stomach acid comes in contact with the esophagus, the acid may cause soreness (inflammation) in the esophagus. Over time, GERD may create small holes (ulcers) in the lining of the esophagus.  What are the causes?  This condition is caused by a problem with the muscle between the esophagus and  the stomach (lower esophageal sphincter, or LES). Normally, the LES muscle closes after food passes through the esophagus to the stomach. When the LES is weakened or abnormal, it does not close properly, and that allows food and stomach acid to go back up into the esophagus.  The LES can be weakened by certain dietary substances, medicines, and medical conditions, including:  · Tobacco use.  · Pregnancy.  · Having a hiatal hernia.  · Alcohol use.  · Certain foods and beverages, such as coffee, chocolate, onions, and peppermint.  What increases the risk?  You are more likely to develop this condition if you:  · Have an increased body weight.  · Have a connective tissue disorder.  · Use NSAID medicines.  What are the signs or symptoms?  Symptoms of this condition include:  · Heartburn.  · Difficult or painful swallowing.  · The feeling of having a lump in the throat.  · A bitter taste in the mouth.  · Bad breath.  · Having a large amount of saliva.  · Having an upset or bloated stomach.  · Belching.  · Chest pain. Different conditions can cause chest pain. Make sure you see your health care provider if you experience chest pain.  · Shortness of breath or wheezing.  · Ongoing (chronic) cough or a night-time cough.  · Wearing away of tooth enamel.  · Weight loss.  How is this diagnosed?  Your health care provider will take a medical history and perform a physical exam. To determine if you have mild or severe GERD, your health care provider may also monitor how you respond to treatment. You may also have tests, including:  · A test to examine your stomach and esophagus with a small camera (endoscopy).  · A test that measures the acidity level in your esophagus.  · A test that measures how much pressure is on your esophagus.  · A barium swallow or modified barium swallow test to show the shape, size, and functioning of your esophagus.  How is this treated?  The goal of treatment is to help relieve your symptoms and to  prevent complications. Treatment for this condition may vary depending on how severe your symptoms are. Your health care provider may recommend:  · Changes to your diet.  · Medicine.  · Surgery.  Follow these instructions at home:  Eating and drinking    · Follow a diet as recommended by your health care provider. This may involve avoiding foods and drinks such as:  ? Coffee and tea (with or without caffeine).  ? Drinks that contain alcohol.  ? Energy drinks and sports drinks.  ? Carbonated drinks or sodas.  ? Chocolate and cocoa.  ? Peppermint and mint flavorings.  ? Garlic and onions.  ? Horseradish.  ? Spicy and acidic foods, including peppers, chili powder, bruno powder, vinegar, hot sauces, and barbecue sauce.  ? Citrus fruit juices and citrus fruits, such as oranges, gagan, and limes.  ? Tomato-based foods, such as red sauce, chili, salsa, and pizza with red sauce.  ? Fried and fatty foods, such as donuts, french fries, potato chips, and high-fat dressings.  ? High-fat meats, such as hot dogs and fatty cuts of red and white meats, such as rib eye steak, sausage, ham, and preciado.  ? High-fat dairy items, such as whole milk, butter, and cream cheese.  · Eat small, frequent meals instead of large meals.  · Avoid drinking large amounts of liquid with your meals.  · Avoid eating meals during the 2-3 hours before bedtime.  · Avoid lying down right after you eat.  · Do not exercise right after you eat.  Lifestyle    · Do not use any products that contain nicotine or tobacco, such as cigarettes, e-cigarettes, and chewing tobacco. If you need help quitting, ask your health care provider.  · Try to reduce your stress by using methods such as yoga or meditation. If you need help reducing stress, ask your health care provider.  · If you are overweight, reduce your weight to an amount that is healthy for you. Ask your health care provider for guidance about a safe weight loss goal.  General instructions  · Pay attention to  any changes in your symptoms.  · Take over-the-counter and prescription medicines only as told by your health care provider. Do not take aspirin, ibuprofen, or other NSAIDs unless your health care provider told you to do so.  · Wear loose-fitting clothing. Do not wear anything tight around your waist that causes pressure on your abdomen.  · Raise (elevate) the head of your bed about 6 inches (15 cm).  · Avoid bending over if this makes your symptoms worse.  · Keep all follow-up visits as told by your health care provider. This is important.  Contact a health care provider if:  · You have:  ? New symptoms.  ? Unexplained weight loss.  ? Difficulty swallowing or it hurts to swallow.  ? Wheezing or a persistent cough.  ? A hoarse voice.  · Your symptoms do not improve with treatment.  Get help right away if you:  · Have pain in your arms, neck, jaw, teeth, or back.  · Feel sweaty, dizzy, or light-headed.  · Have chest pain or shortness of breath.  · Vomit and your vomit looks like blood or coffee grounds.  · Faint.  · Have stool that is bloody or black.  · Cannot swallow, drink, or eat.  Summary  · Gastroesophageal reflux happens when acid from the stomach flows up into the esophagus. GERD is a disease in which the reflux happens often, causes frequent or severe symptoms, or causes problems such as damage to the esophagus.  · Treatment for this condition may vary depending on how severe your symptoms are. Your health care provider may recommend diet and lifestyle changes, medicine, or surgery.  · Contact a health care provider if you have new or worsening symptoms.  · Take over-the-counter and prescription medicines only as told by your health care provider. Do not take aspirin, ibuprofen, or other NSAIDs unless your health care provider told you to do so.  · Keep all follow-up visits as told by your health care provider. This is important.  This information is not intended to replace advice given to you by your health  care provider. Make sure you discuss any questions you have with your health care provider.  Document Released: 09/27/2006 Document Revised: 06/26/2019 Document Reviewed: 06/26/2019  [employer] Patient Education © 2020 [employer]    Food Choices for Gastroesophageal Reflux Disease, Adult  When you have gastroesophageal reflux disease (GERD), the foods you eat and your eating habits are very important. Choosing the right foods can help ease the discomfort of GERD. Consider working with a diet and nutrition specialist (dietitian) to help you make healthy food choices.  What general guidelines should I follow?    Eating plan  · Choose healthy foods low in fat, such as fruits, vegetables, whole grains, low-fat dairy products, and lean meat, fish, and poultry.  · Eat frequent, small meals instead of three large meals each day. Eat your meals slowly, in a relaxed setting. Avoid bending over or lying down until 2-3 hours after eating.  · Limit high-fat foods such as fatty meats or fried foods.  · Limit your intake of oils, butter, and shortening to less than 8 teaspoons each day.  · Avoid the following:  ? Foods that cause symptoms. These may be different for different people. Keep a food diary to keep track of foods that cause symptoms.  ? Alcohol.  ? Drinking large amounts of liquid with meals.  ? Eating meals during the 2-3 hours before bed.  · Cook foods using methods other than frying. This may include baking, grilling, or broiling.  Lifestyle  · Maintain a healthy weight. Ask your health care provider what weight is healthy for you. If you need to lose weight, work with your health care provider to do so safely.  · Exercise for at least 30 minutes on 5 or more days each week, or as told by your health care provider.  · Avoid wearing clothes that fit tightly around your waist and chest.  · Do not use any products that contain nicotine or tobacco, such as cigarettes and e-cigarettes. If you need help quitting, ask  your health care provider.  · Sleep with the head of your bed raised. Use a wedge under the mattress or blocks under the bed frame to raise the head of the bed.  What foods are not recommended?  The items listed may not be a complete list. Talk with your dietitian about what dietary choices are best for you.  Grains  Pastries or quick breads with added fat. French toast.  Vegetables  Deep fried vegetables. French fries. Any vegetables prepared with added fat. Any vegetables that cause symptoms. For some people this may include tomatoes and tomato products, chili peppers, onions and garlic, and horseradish.  Fruits  Any fruits prepared with added fat. Any fruits that cause symptoms. For some people this may include citrus fruits, such as oranges, grapefruit, pineapple, and gagan.  Meats and other protein foods  High-fat meats, such as fatty beef or pork, hot dogs, ribs, ham, sausage, salami and prceiado. Fried meat or protein, including fried fish and fried chicken. Nuts and nut butters.  Dairy  Whole milk and chocolate milk. Sour cream. Cream. Ice cream. Cream cheese. Milk shakes.  Beverages  Coffee and tea, with or without caffeine. Carbonated beverages. Sodas. Energy drinks. Fruit juice made with acidic fruits (such as orange or grapefruit). Tomato juice. Alcoholic drinks.  Fats and oils  Butter. Margarine. Shortening. Ghee.  Sweets and desserts  Chocolate and cocoa. Donuts.  Seasoning and other foods  Pepper. Peppermint and spearmint. Any condiments, herbs, or seasonings that cause symptoms. For some people, this may include bruno, hot sauce, or vinegar-based salad dressings.  Summary  · When you have gastroesophageal reflux disease (GERD), food and lifestyle choices are very important to help ease the discomfort of GERD.  · Eat frequent, small meals instead of three large meals each day. Eat your meals slowly, in a relaxed setting. Avoid bending over or lying down until 2-3 hours after eating.  · Limit high-fat  foods such as fatty meat or fried foods.  This information is not intended to replace advice given to you by your health care provider. Make sure you discuss any questions you have with your health care provider.  Document Released: 12/18/2006 Document Revised: 04/09/2020 Document Reviewed: 12/19/2017  [employer] Patient Education © 2020 [employer]    Dysmenorrhea    Dysmenorrhea refers to cramps caused by the muscles of the uterus tightening (sampson) during a menstrual period. Dysmenorrhea may be mild, or it may be severe enough to interfere with everyday activities for a few days each month.  Primary dysmenorrhea is menstrual cramps that last a couple of days when you start having menstrual periods or soon after. This often begins after a teenager starts having her period. As a woman gets older or has a baby, the cramps will usually lessen or disappear. Secondary dysmenorrhea begins later in life and is caused by a disorder in the reproductive system. It lasts longer, and it may cause more pain than primary dysmenorrhea. The pain may start before the period and last a few days after the period.  What are the causes?  Dysmenorrhea is usually caused by an underlying problem, such as:  · The tissue that lines the uterus (endometrium) growing outside of the uterus in other areas of the body (endometriosis).  · Endometrial tissue growing into the muscular walls of the uterus (adenomyosis).  · Blood vessels in the pelvis becoming filled with blood just before the menstrual period (pelvic congestive syndrome).  · Overgrowth of cells (polyps) in the endometrium or the lower part of the uterus (cervix).  · The uterus dropping down into the vagina (prolapse) due to stretched or weak muscles.  · Bladder problems, such as infection or inflammation.  · Intestinal problems, such as a tumor or irritable bowel syndrome.  · Cancer of the reproductive organs or bladder.  · A severely tipped uterus.  · A cervix that is closed  or has a very small opening.  · Noncancerous (benign) tumors of the uterus (fibroids).  · Pelvic inflammatory disease (PID).  · Pelvic scarring (adhesions) from a previous surgery.  · An ovarian cyst.  · An IUD (intrauterine device).  What increases the risk?  You are more likely to develop this condition if:  · You are younger than age 30.  · You started puberty early.  · You have irregular or heavy bleeding.  · You have never given birth.  · You have a family history of dysmenorrhea.  · You smoke.  What are the signs or symptoms?  Symptoms of this condition include:  · Cramping, throbbing pain, or a feeling of fullness in the lower abdomen.  · Lower back pain.  · Periods lasting for longer than 7 days.  · Headaches.  · Bloating.  · Fatigue.  · Nausea or vomiting.  · Diarrhea.  · Sweating or dizziness.  · Loose stools.  How is this diagnosed?  This condition may be diagnosed based on:  · Your symptoms.  · Your medical history.  · A physical exam.  · Blood tests.  · A Pap test. This is a test in which cells from the cervix are tested for signs of cancer or infection.  · A pregnancy test.  · Imaging tests, such as:  ? Ultrasound.  ? A procedure to remove and examine a sample of endometrial tissue (dilation and curettage, D&C).  ? A procedure to visually examine the inside of:  § The uterus (hysteroscopy).  § The abdomen or pelvis (laparoscopy).  § The bladder (cystoscopy).  § The intestine (colonoscopy).  § The stomach (gastroscopy).  ? X-rays.  ? CT scan.  ? MRI.  How is this treated?  Treatment depends on the cause of the dysmenorrhea. Treatment may include:  · Pain medicine prescribed by your health care provider.  · Birth control pills that contain the hormone progesterone.  · An IUD that contains the hormone progesterone.  · Medicines to control bleeding.  · Hormone replacement therapy.  · NSAIDs. These may help to stop the production of hormones that cause cramps.  · Antidepressant medicines.  · Surgery to  remove adhesions, endometriosis, ovarian cysts, fibroids, or the entire uterus (hysterectomy).  · Injections of progesterone to stop the menstrual period.  · A procedure to destroy the endometrium (endometrial ablation).  · A procedure to cut the nerves in the bottom of the spine (sacrum) that go to the reproductive organs (presacral neurectomy).  · A procedure to apply an electric current to nerves in the sacrum (sacral nerve stimulation).  · Exercise and physical therapy.  · Meditation and yoga therapy.  · Acupuncture.  Work with your health care provider to determine what treatment or combination of treatments is best for you.  Follow these instructions at home:  Relieving pain and cramping  · Apply heat to your lower back or abdomen when you experience pain or cramps. Use the heat source that your health care provider recommends, such as a moist heat pack or a heating pad.  ? Place a towel between your skin and the heat source.  ? Leave the heat on for 20-30 minutes.  ? Remove the heat if your skin turns bright red. This is especially important if you are unable to feel pain, heat, or cold. You may have a greater risk of getting burned.  ? Do not sleep with a heating pad on.  · Do aerobic exercises, such as walking, swimming, or biking. This can help to relieve cramps.  · Massage your lower back or abdomen to help relieve pain.  General instructions  · Take over-the-counter and prescription medicines only as told by your health care provider.  · Do not drive or use heavy machinery while taking prescription pain medicine.  · Avoid alcohol and caffeine during and right before your menstrual period. These can make cramps worse.  · Do not use any products that contain nicotine or tobacco, such as cigarettes and e-cigarettes. If you need help quitting, ask your health care provider.  · Keep all follow-up visits as told by your health care provider. This is important.  Contact a health care provider if:  · You have  pain that gets worse or does not get better with medicine.  · You have pain with sex.  · You develop nausea or vomiting with your period that is not controlled with medicine.  Get help right away if:  · You faint.  Summary  · Dysmenorrhea refers to cramps caused by the muscles of the uterus tightening (sampson) during a menstrual period.  · Dysmenorrhea may be mild, or it may be severe enough to interfere with everyday activities for a few days each month.  · Treatment depends on the cause of the dysmenorrhea.  · Work with your health care provider to determine what treatment or combination of treatments is best for you.  This information is not intended to replace advice given to you by your health care provider. Make sure you discuss any questions you have with your health care provider.  Document Released: 12/18/2006 Document Revised: 11/30/2018 Document Reviewed: 01/20/2018  [employer] Patient Education © 2020 [employer]

## 2020-07-16 LAB — TSH SERPL DL<=0.05 MIU/L-ACNC: 1.46 UIU/ML (ref 0.27–4.2)

## 2020-07-17 LAB
C PEPTIDE SERPL-MCNC: 4 NG/ML (ref 1.1–4.4)
INSULIN SERPL-ACNC: 33.3 UIU/ML (ref 2.6–24.9)

## 2020-07-22 NOTE — PROGRESS NOTES
"[facility]  Heart and Valve Center      2020         Karma Morrissey  [address]  [facility] [address]  [unfilled]    1995    Provider, No Known    Karma Morrissey is a 24 y.o. female.      Subjective:     Chief Complaint:  Dizziness and Establish Care       HPI   24-year-old female with history of anxiety, depression and bipolar disorder who presents today for evaluation of near syncope.  Patient reports chronic dizziness for over a year.  Associated headaches. She notes episodes of complete \"black out\" where she loses her vision for less than a second and then falls. She has never injured herself. She works as a  and prolonged standing and feels orthostatic dizziness. She does note that anxiety makes it worse. Has never fully passed out or lost consciousness. She reports an \"icey/tingling\" feeling in her stomach that radiates up to her head. Associated palpitations, chest heaviness, and shortness of breath. She does note KINCAID but denies exertional chest pain. She reports her aunt  at age 48 suddenly of unknown causes but no other family hx of SCD. She reports that she drinks a lot of water. Drinks one soda a day. Recently cut out coffee.     Cardiac risks: obesity    Patient Active Problem List   Diagnosis   • Strain of extensor muscle of finger at forearm level   • Morbidly obese (CMS/HCC)   • Anxiety   • Bipolar disorder (CMS/HCC)   • Depression       Past Medical History:   Diagnosis Date   • Anxiety    • Bipolar disorder (CMS/HCC)    • Depression        Past Surgical History:   Procedure Laterality Date   • TONSILLECTOMY     • WISDOM TOOTH EXTRACTION         Family History   Problem Relation Age of Onset   • Epilepsy Mother    • Depression Mother    • Bipolar disorder Father    • Depression Father    • Anxiety disorder Father    • Anxiety disorder Sister    • Bipolar disorder Sister    • Heart attack Maternal Grandfather    • No Known Problems Maternal Grandmother  "   • No Known Problems Paternal Grandmother        Social History     Socioeconomic History   • Marital status: Single     Spouse name: Not on file   • Number of children: Not on file   • Years of education: Not on file   • Highest education level: Not on file   Tobacco Use   • Smoking status: Never Smoker   • Smokeless tobacco: Never Used   Substance and Sexual Activity   • Alcohol use: Yes     Frequency: Monthly or less     Drinks per session: 1 or 2     Comment: ON OCCASION   • Drug use: No   • Sexual activity: Yes     Partners: Male     Birth control/protection: None   Social History Narrative    Caffeine: Venti [employer] daily and 16 oz soda daily       No Known Allergies      Current Outpatient Medications:   •  lamoTRIgine (LaMICtal) 150 MG tablet, Daily., Disp: , Rfl:   •  melatonin 1 MG tablet, Take  by mouth Every Night., Disp: , Rfl:   •  pantoprazole (PROTONIX) 40 MG EC tablet, Take 1 tablet by mouth Daily., Disp: 30 tablet, Rfl: 2  •  traZODone (DESYREL) 50 MG tablet, Every Night., Disp: , Rfl:   •  ziprasidone (GEODON) 40 MG capsule, Daily., Disp: , Rfl:     The following portions of the patient's history were reviewed today and updated as appropriate: allergies, current medications, past family history, past medical history, past social history, past surgical history and problem list     Review of Systems   Constitution: Negative for chills and fever.   Eyes: Negative.    Cardiovascular: Positive for palpitations. Negative for chest pain, claudication, cyanosis, dyspnea on exertion, irregular heartbeat, leg swelling, near-syncope, orthopnea, paroxysmal nocturnal dyspnea and syncope.   Respiratory: Negative for cough, shortness of breath and snoring.    Endocrine: Negative.    Hematologic/Lymphatic: Does not bruise/bleed easily.   Skin: Negative for poor wound healing.   Musculoskeletal: Negative.    Gastrointestinal: Positive for heartburn. Negative for abdominal pain, hematemesis, melena, nausea and  "vomiting.   Genitourinary: Negative.  Negative for hematuria.   Neurological: Positive for dizziness, headaches and light-headedness.   Psychiatric/Behavioral: Negative.    Allergic/Immunologic: Negative.        Objective:     Vitals:    07/24/20 0802 07/24/20 0803 07/24/20 0804   BP: 118/74 117/70 166/96   BP Location: Left arm Left arm Right arm   Patient Position: Standing Sitting Sitting   Cuff Size: Large Adult Large Adult Large Adult   Pulse: 108 89 98   Resp:   18   Temp:   98 °F (36.7 °C)   TempSrc:   Temporal   SpO2: 98% 98% 99%   Weight:   (!) 143 kg (315 lb 8 oz)   Height:   170.2 cm (67\")       Body mass index is 49.41 kg/m².    Physical Exam   Constitutional: She is oriented to person, place, and time. She appears well-developed and well-nourished. No distress.   HENT:   Head: Normocephalic.   Eyes: Pupils are equal, round, and reactive to light. Conjunctivae are normal.   Neck: Neck supple. No JVD present. No thyromegaly present.   Cardiovascular: Normal rate, regular rhythm, normal heart sounds and intact distal pulses. Exam reveals no gallop and no friction rub.   No murmur heard.  Pulmonary/Chest: Effort normal and breath sounds normal. No respiratory distress. She has no wheezes. She has no rales. She exhibits no tenderness.   Abdominal: Soft. Bowel sounds are normal.   Musculoskeletal: Normal range of motion. She exhibits no edema.   Neurological: She is alert and oriented to person, place, and time.   Skin: Skin is warm and dry.   Psychiatric: She has a normal mood and affect. Her behavior is normal. Thought content normal.   Vitals reviewed.      Lab and Diagnostic Review:  Results for orders placed or performed in visit on 07/15/20   TSH   Result Value Ref Range    TSH 1.460 0.270 - 4.200 uIU/mL   Hemoglobin A1c   Result Value Ref Range    Hemoglobin A1C 5.37 4.80 - 5.60 %   Insulin, Total   Result Value Ref Range    Insulin 33.3 (H) 2.6 - 24.9 uIU/mL   C-Peptide   Result Value Ref Range    " C-Peptide 4.0 1.1 - 4.4 ng/mL   POC Pregnancy, Urine   Result Value Ref Range    HCG, Urine, QL Negative Negative    Lot Number [id number]     Internal Positive Control Positive     Internal Negative Control Negative    POC Urinalysis Dipstick, Automated   Result Value Ref Range    Color Yellow Yellow, Straw, Dark Yellow, Adriana    Clarity, UA Cloudy (A) Clear    Specific Gravity  1.030 1.005 - 1.030    pH, Urine 5.5 5.0 - 8.0    Leukocytes Negative Negative    Nitrite, UA Negative Negative    Protein, POC Negative Negative mg/dL    Glucose, UA Negative Negative, 1000 mg/dL (3+) mg/dL    Ketones, UA Negative Negative    Urobilinogen, UA Normal Normal    Bilirubin Negative Negative    Blood, UA Negative Negative     EKG: Normal sinus rhythm    Assessment and Plan:   1. Near syncope  Advised to maintain good hydration, recommend compression stocking and calf pump exercises when standing  - ECG 12 Lead; Future  - Adult Transthoracic Echo Complete W/ Cont if Necessary Per Protocol; Future  - Mobile Cardiac Outpatient Telemetry; Future    2. Palpitations  Continue to avoid caffeine  - Adult Transthoracic Echo Complete W/ Cont if Necessary Per Protocol; Future  - Mobile Cardiac Outpatient Telemetry; Future    3. KINCAID (dyspnea on exertion)  - Adult Transthoracic Echo Complete W/ Cont if Necessary Per Protocol; Future    Video visit in 6 weeks      It has been a pleasure to participate in the care of this patient.  Patient was instructed to call the Heart and Valve Center with any questions, concerns, or worsening symptoms.    *Please note that portions of this note were completed with a voice recognition program. Efforts were made to edit the dictations, but occasionally words are mistranscribed.

## 2020-07-24 ENCOUNTER — HOSPITAL ENCOUNTER (OUTPATIENT)
Dept: CARDIOLOGY | Facility: HOSPITAL | Age: 25
Discharge: HOME | End: 2020-07-24

## 2020-07-24 ENCOUNTER — OFFICE VISIT (OUTPATIENT)
Dept: CARDIOLOGY | Facility: HOSPITAL | Age: 25
End: 2020-07-24

## 2020-07-24 ENCOUNTER — HOSPITAL ENCOUNTER (OUTPATIENT)
Dept: CARDIOLOGY | Facility: HOSPITAL | Age: 25
Discharge: HOME | End: 2020-07-24
Admitting: NURSE PRACTITIONER

## 2020-07-24 VITALS
BODY MASS INDEX: 45.99 KG/M2 | HEART RATE: 98 BPM | WEIGHT: 293 LBS | HEIGHT: 67 IN | TEMPERATURE: 98 F | RESPIRATION RATE: 18 BRPM | SYSTOLIC BLOOD PRESSURE: 166 MMHG | OXYGEN SATURATION: 99 % | DIASTOLIC BLOOD PRESSURE: 96 MMHG

## 2020-07-24 DIAGNOSIS — R55 NEAR SYNCOPE: ICD-10-CM

## 2020-07-24 DIAGNOSIS — R00.2 PALPITATIONS: ICD-10-CM

## 2020-07-24 DIAGNOSIS — R06.09 DOE (DYSPNEA ON EXERTION): ICD-10-CM

## 2020-07-24 DIAGNOSIS — R42 DIZZINESS: ICD-10-CM

## 2020-07-24 DIAGNOSIS — R55 NEAR SYNCOPE: Primary | ICD-10-CM

## 2020-07-24 PROCEDURE — 99214 OFFICE O/P EST MOD 30 MIN: CPT | Performed by: NURSE PRACTITIONER

## 2020-07-24 PROCEDURE — 93010 ELECTROCARDIOGRAM REPORT: CPT | Performed by: INTERNAL MEDICINE

## 2020-07-24 PROCEDURE — 93005 ELECTROCARDIOGRAM TRACING: CPT | Performed by: NURSE PRACTITIONER

## 2020-07-24 RX ORDER — UREA 10 %
LOTION (ML) TOPICAL NIGHTLY
COMMUNITY
End: 2021-02-03

## 2020-07-24 NOTE — PROGRESS NOTES
[facility] Heart Monitor Documentation    Karma Morrissey  1995  [id number]  07/24/20    CHRISTIANO Warner    [] ZIO XT Patch  Model [id number] Prescribed for  Days    · Serial Number: (N + 9 Digits) N   · Apply-By Date on Box:   · USPS Tracking Number:   · USPS Tracking        [x] [employer] BodyGuardian MINI PLUS Mobile Cardiac Telemetry  Model BGMINIPLUS Prescribed for 30 Days    · Serial Number: (BGM + 7 Digits) [id number]  · Shipped-By Date on Box: 07/22/2020  · UPS Tracking Number: [id number]  · UPS Tracking      [] [employer] BodyGuardian MINI Holter Monitor  Model BGMINIEL Prescribed for N/A Days    · Serial Number: (7 Digits) Shipped-By Date on Box:   · UPS Tracking Number: [id number]  · UPS Tracking        This monitor was applied to the patient's chest and checked for proper functioning.  Ms. Karma Morrissey was instructed in the proper use of this monitor.  She was given the opportunity to ask questions and left the office with the device 's instruction manual.    Raine Rivera CMA, 8:38 AM, 07/24/20                  [facility] 8.8.[facility]

## 2020-09-01 PROCEDURE — 93272 ECG/REVIEW INTERPRET ONLY: CPT | Performed by: INTERNAL MEDICINE

## 2020-09-11 NOTE — PROGRESS NOTES
"[facility]  Heart and Valve Center  Telemedicine Note    09/14/2020         Karma Morrissey  [address]  [facility] [address]  [unfilled]    1995    Provider, No Known    Karma Morrissey is a 24 y.o. female.      Subjective:     Chief Complaint:  Follow-up and Dizziness     This was an audio enabled telemedicine encounter. Patient was unable to get connected to video    You have chosen to receive care through a telephone visit. Do you consent to use a telephone visit for your medical care today? Yes        HPI   24-year-old female with history of anxiety, depression and bipolar disorder who presents today to follow up on near syncope.  Patient wore event monitor for approximately 19 days which was negative for arrhythmias.  Average heart rate was 84.  Triggered events were normal sinus rhythm and sinus tachycardia.  There was one report of her \"passing out\" but this was an accidental push. She says she is feeling better. She thinks it was more related to anxiety.     Cardiac risks: obesity    Patient Active Problem List   Diagnosis   • Strain of extensor muscle of finger at forearm level   • Morbidly obese (CMS/HCC)   • Anxiety   • Bipolar disorder (CMS/HCC)   • Depression       Past Medical History:   Diagnosis Date   • Anxiety    • Bipolar disorder (CMS/HCC)    • Depression        Past Surgical History:   Procedure Laterality Date   • TONSILLECTOMY     • WISDOM TOOTH EXTRACTION         Family History   Problem Relation Age of Onset   • Epilepsy Mother    • Depression Mother    • Bipolar disorder Father    • Depression Father    • Anxiety disorder Father    • Anxiety disorder Sister    • Bipolar disorder Sister    • Heart attack Maternal Grandfather    • No Known Problems Maternal Grandmother    • No Known Problems Paternal Grandmother        Social History     Socioeconomic History   • Marital status: Single     Spouse name: Not on file   • Number of children: Not on file   • Years of " education: Not on file   • Highest education level: Not on file   Tobacco Use   • Smoking status: Never Smoker   • Smokeless tobacco: Never Used   Substance and Sexual Activity   • Alcohol use: Yes     Frequency: Monthly or less     Drinks per session: 1 or 2     Comment: ON OCCASION   • Drug use: No   • Sexual activity: Yes     Partners: Male     Birth control/protection: None   Social History Narrative    Caffeine: Venti [employer] daily and 16 oz soda daily       No Known Allergies      Current Outpatient Medications:   •  lamoTRIgine (LaMICtal) 150 MG tablet, Daily., Disp: , Rfl:   •  melatonin 1 MG tablet, Take  by mouth Every Night., Disp: , Rfl:   •  pantoprazole (PROTONIX) 40 MG EC tablet, Take 1 tablet by mouth Daily., Disp: 30 tablet, Rfl: 2  •  traZODone (DESYREL) 50 MG tablet, Every Night., Disp: , Rfl:   •  ziprasidone (GEODON) 40 MG capsule, Daily., Disp: , Rfl:     The following portions of the patient's history were reviewed today and updated as appropriate: allergies, current medications, past family history, past medical history, past social history, past surgical history and problem list     Review of Systems   Constitution: Negative for chills and fever.   Eyes: Negative.    Cardiovascular: Negative for chest pain, claudication, cyanosis, dyspnea on exertion, irregular heartbeat, leg swelling, near-syncope, orthopnea, palpitations, paroxysmal nocturnal dyspnea and syncope.   Respiratory: Negative for cough, shortness of breath and snoring.    Endocrine: Negative.    Hematologic/Lymphatic: Does not bruise/bleed easily.   Skin: Negative for poor wound healing.   Musculoskeletal: Negative.    Gastrointestinal: Positive for heartburn. Negative for abdominal pain, hematemesis, melena, nausea and vomiting.   Genitourinary: Negative.  Negative for hematuria.   Neurological: Positive for dizziness, headaches and light-headedness.   Psychiatric/Behavioral: Positive for depression. The patient is  "nervous/anxious.    Allergic/Immunologic: Negative.        Objective:     Vitals:    09/14/20 1529   Weight: (!) 137 kg (302 lb)   Height: 168.9 cm (66.5\")       Body mass index is 48.01 kg/m².    Physical Exam   Constitutional: She is oriented to person, place, and time. She appears well-developed and well-nourished. No distress.   HENT:   Head: Normocephalic.   Eyes: Pupils are equal, round, and reactive to light. Conjunctivae are normal.   Neck: Neck supple. No JVD present. No thyromegaly present.   Cardiovascular: Normal rate, regular rhythm, normal heart sounds and intact distal pulses. Exam reveals no gallop and no friction rub.   No murmur heard.  Pulmonary/Chest: Effort normal and breath sounds normal. No respiratory distress. She has no wheezes. She has no rales. She exhibits no tenderness.   Abdominal: Soft. Bowel sounds are normal.   Musculoskeletal: Normal range of motion. She exhibits no edema.   Neurological: She is alert and oriented to person, place, and time.   Skin: Skin is warm and dry.   Psychiatric: She has a normal mood and affect. Her behavior is normal. Thought content normal.   Vitals reviewed.      Lab and Diagnostic Review:  Cardiac event monitor 7/2020 x 19 days showed an average heart of 84, rate of 46 and a maximum of 167.  Triggered events were normal sinus rhythm/sinus tachycardia.  No significant arrhythmias noted    Assessment and Plan:   1. Near syncope  Advised to maintain good hydration, recommend compression stockings  No arrhythmias    2. Palpitations  No arrhythmias   Continue to avoid caffeine  She feels they were anxiety related, she plans to follow up with her psychiatrist    This visit has been rescheduled as a phone visit to comply with patient safety concerns in accordance with CDC recommendations. Total time of discussion was 8 minutes.            It has been a pleasure to participate in the care of this patient.  Patient was instructed to call the Heart and Valve " Center with any questions, concerns, or worsening symptoms.    *Please note that portions of this note were completed with a voice recognition program. Efforts were made to edit the dictations, but occasionally words are mistranscribed.

## 2020-09-14 ENCOUNTER — TELEMEDICINE (OUTPATIENT)
Dept: CARDIOLOGY | Facility: HOSPITAL | Age: 25
End: 2020-09-14

## 2020-09-14 VITALS — BODY MASS INDEX: 45.99 KG/M2 | HEIGHT: 67 IN | WEIGHT: 293 LBS

## 2020-09-14 PROCEDURE — 99212 OFFICE O/P EST SF 10 MIN: CPT | Performed by: NURSE PRACTITIONER

## 2020-10-10 DIAGNOSIS — K21.9 GASTROESOPHAGEAL REFLUX DISEASE: ICD-10-CM

## 2020-10-11 RX ORDER — PANTOPRAZOLE SODIUM 40 MG/1
TABLET, DELAYED RELEASE ORAL
Qty: 90 TABLET | Refills: 1 | Status: SHIPPED | OUTPATIENT
Start: 2020-10-11 | End: 2020-11-30

## 2020-10-26 ENCOUNTER — LAB REQUISITION (OUTPATIENT)
Dept: LAB | Facility: HOSPITAL | Age: 25
End: 2020-10-26

## 2020-10-26 DIAGNOSIS — Z00.00 ROUTINE GENERAL MEDICAL EXAMINATION AT A HEALTH CARE FACILITY: ICD-10-CM

## 2020-10-26 PROCEDURE — 86481 TB AG RESPONSE T-CELL SUSP: CPT

## 2020-10-28 LAB
TSPOT INTERPRETATION: NEGATIVE
TSPOT NIL CONTROL INTERPRETATION: NORMAL
TSPOT PANEL A: 1
TSPOT PANEL B: 0
TSPOT POS CONTROL INTERPRETATION: NORMAL

## 2020-11-30 ENCOUNTER — OFFICE VISIT (OUTPATIENT)
Dept: FAMILY MEDICINE CLINIC | Facility: CLINIC | Age: 25
End: 2020-11-30

## 2020-11-30 VITALS
BODY MASS INDEX: 47.09 KG/M2 | TEMPERATURE: 98 F | DIASTOLIC BLOOD PRESSURE: 86 MMHG | HEART RATE: 86 BPM | RESPIRATION RATE: 16 BRPM | WEIGHT: 293 LBS | OXYGEN SATURATION: 98 % | SYSTOLIC BLOOD PRESSURE: 130 MMHG | HEIGHT: 66 IN

## 2020-11-30 DIAGNOSIS — L98.9 SKIN LESION OF RIGHT LEG: Primary | ICD-10-CM

## 2020-11-30 PROCEDURE — 99213 OFFICE O/P EST LOW 20 MIN: CPT | Performed by: NURSE PRACTITIONER

## 2020-11-30 RX ORDER — LAMOTRIGINE 100 MG/1
TABLET ORAL
COMMUNITY
Start: 2020-11-17 | End: 2021-02-03

## 2021-02-02 ENCOUNTER — OFFICE VISIT (OUTPATIENT)
Dept: FAMILY MEDICINE CLINIC | Facility: CLINIC | Age: 26
End: 2021-02-02

## 2021-02-02 VITALS
DIASTOLIC BLOOD PRESSURE: 72 MMHG | SYSTOLIC BLOOD PRESSURE: 128 MMHG | TEMPERATURE: 97.3 F | BODY MASS INDEX: 47.09 KG/M2 | OXYGEN SATURATION: 98 % | HEIGHT: 66 IN | WEIGHT: 293 LBS | HEART RATE: 88 BPM | RESPIRATION RATE: 18 BRPM

## 2021-02-02 DIAGNOSIS — E66.01 MORBIDLY OBESE (HCC): ICD-10-CM

## 2021-02-02 DIAGNOSIS — R13.14 PHARYNGOESOPHAGEAL DYSPHAGIA: Primary | ICD-10-CM

## 2021-02-02 DIAGNOSIS — F31.9 BIPOLAR AFFECTIVE DISORDER, REMISSION STATUS UNSPECIFIED (HCC): ICD-10-CM

## 2021-02-02 PROCEDURE — 99213 OFFICE O/P EST LOW 20 MIN: CPT | Performed by: NURSE PRACTITIONER

## 2021-02-02 RX ORDER — PANTOPRAZOLE SODIUM 40 MG/1
40 TABLET, DELAYED RELEASE ORAL DAILY
Qty: 30 TABLET | Refills: 1 | OUTPATIENT
Start: 2021-02-02 | End: 2021-03-21

## 2021-02-02 NOTE — PATIENT INSTRUCTIONS
Dysphagia    Dysphagia is trouble swallowing. This condition occurs when solids and liquids stick in a person's throat on the way down to the stomach, or when food takes longer to get to the stomach than usual.  You may have problems swallowing food, liquids, or both. You may also have pain while trying to swallow. It may take you more time and effort to swallow something.  What are the causes?  This condition may be caused by:  · Muscle problems. They may make it difficult for you to move food and liquids through the esophagus, which is the tube that connects your mouth to your stomach.  · Blockages. You may have ulcers, scar tissue, or inflammation that blocks the normal passage of food and liquids. Causes of these problems include:  ? Acid reflux from your stomach into your esophagus (gastroesophageal reflux).  ? Infections.  ? Radiation treatment for cancer.  ? Medicines taken without enough fluids to wash them down into your stomach.  · Stroke. This can affect the nerves and make it difficult to swallow.  · Nerve problems. These prevent signals from being sent to the muscles of your esophagus to squeeze (contract) and move what you swallow down to your stomach.  · Globus pharyngeus. This is a common problem that involves a feeling like something is stuck in your throat or a sense of trouble with swallowing, even though nothing is wrong with the swallowing passages.  · Certain conditions, such as cerebral palsy or Parkinson's disease.  What are the signs or symptoms?  Common symptoms of this condition include:  · A feeling that solids or liquids are stuck in your throat on the way down to the stomach.  · Pain while swallowing.  · Coughing or gagging while trying to swallow.  Other symptoms include:  · Food moving back from your stomach to your mouth (regurgitation).  · Noises coming from your throat.  · Chest discomfort with swallowing.  · A feeling of fullness when swallowing.  · Drooling, especially when the  throat is blocked.  · Heartburn.  How is this diagnosed?  This condition may be diagnosed by:  · Barium X-ray. In this test, you will swallow a white liquid that sticks to the inside of your esophagus. X-ray images are then taken.  · Endoscopy. In this test, a flexible telescope is inserted down your throat to look at your esophagus and your stomach.  · CT scans and an MRI.  How is this treated?  Treatment for dysphagia depends on the cause of this condition, such as:  · If the dysphagia is caused by acid reflux or infection, medicines may be used. They may include antibiotics and heartburn medicines.  · If the dysphagia is caused by problems with the muscles, swallowing therapy may be used to help you strengthen your swallowing muscles. You may have to do specific exercises to strengthen the muscles or stretch them.  · If the dysphagia is caused by a blockage or mass, procedures to remove the blockage may be done. You may need surgery and a feeding tube.  You may need to make diet changes. Ask your health care provider for specific instructions.  Follow these instructions at home:  Medicines  · Take over-the-counter and prescription medicines only as told by your health care provider.  · If you were prescribed an antibiotic medicine, take it as told by your health care provider. Do not stop taking the antibiotic even if you start to feel better.  Eating and drinking    · Follow any diet changes as told by your health care provider.  · Work with a diet and nutrition specialist (dietitian) to create an eating plan that will help you get the nutrients you need in order to stay healthy.  · Eat soft foods that are easier to swallow.  · Cut your food into small pieces and eat slowly. Take small bites.  · Eat and drink only when you are sitting upright.  · Do not drink alcohol or caffeine. If you need help quitting, ask your health care provider.  General instructions  · Check your weight every day to make sure you are  not losing weight.  · Do not use any products that contain nicotine or tobacco, such as cigarettes, e-cigarettes, and chewing tobacco. If you need help quitting, ask your health care provider.  · Keep all follow-up visits as told by your health care provider. This is important.  Contact a health care provider if you:  · Lose weight because you cannot swallow.  · Cough when you drink liquids.  · Cough up partially digested food.  Get help right away if you:  · Cannot swallow your saliva.  · Have shortness of breath, a fever, or both.  · Have a hoarse voice and also have trouble swallowing.  Summary  · Dysphagia is trouble swallowing. This condition occurs when solids and liquids stick in a person's throat on the way down to the stomach. You may cough or gag while trying to swallow.  · Dysphagia has many possible causes.  · Treatment for dysphagia depends on the cause of the condition.  · Keep all follow-up visits as told by your health care provider. This is important.  This information is not intended to replace advice given to you by your health care provider. Make sure you discuss any questions you have with your health care provider.  Document Revised: 05/13/2020 Document Reviewed: 05/13/2020  [employer] Patient Education © 2020 [employer]  Pantoprazole tablets  What is this medicine?  PANTOPRAZOLE (pan TOE pra zole) prevents the production of acid in the stomach. It is used to treat gastroesophageal reflux disease (GERD), inflammation of the esophagus, and Zollinger-Sparks syndrome.  This medicine may be used for other purposes; ask your health care provider or pharmacist if you have questions.  COMMON BRAND NAME(S): Protonix  What should I tell my health care provider before I take this medicine?  They need to know if you have any of these conditions:  · liver disease  · low levels of magnesium in the blood  · lupus  · an unusual or allergic reaction to omeprazole, lansoprazole, pantoprazole, rabeprazole,  other medicines, foods, dyes, or preservatives  · pregnant or trying to get pregnant  · breast-feeding  How should I use this medicine?  Take this medicine by mouth. Swallow the tablets whole with a drink of water. Follow the directions on the prescription label. Do not crush, break, or chew. Take your medicine at regular intervals. Do not take your medicine more often than directed.  Talk to your pediatrician regarding the use of this medicine in children. While this drug may be prescribed for children as young as 5 years for selected conditions, precautions do apply.  Overdosage: If you think you have taken too much of this medicine contact a poison control center or emergency room at once.  NOTE: This medicine is only for you. Do not share this medicine with others.  What if I miss a dose?  If you miss a dose, take it as soon as you can. If it is almost time for your next dose, take only that dose. Do not take double or extra doses.  What may interact with this medicine?  Do not take this medicine with any of the following medications:  · atazanavir  · nelfinavir  This medicine may also interact with the following medications:  · ampicillin  · delavirdine  · erlotinib  · iron salts  · medicines for fungal infections like ketoconazole, itraconazole and voriconazole  · methotrexate  · mycophenolate mofetil  · warfarin  This list may not describe all possible interactions. Give your health care provider a list of all the medicines, herbs, non-prescription drugs, or dietary supplements you use. Also tell them if you smoke, drink alcohol, or use illegal drugs. Some items may interact with your medicine.  What should I watch for while using this medicine?  It can take several days before your stomach pain gets better. Check with your doctor or health care provider if your condition does not start to get better, or if it gets worse.  This medicine may cause serious skin reactions. They can happen weeks to months after  starting the medicine. Contact your health care provider right away if you notice fevers or flu-like symptoms with a rash. The rash may be red or purple and then turn into blisters or peeling of the skin. Or, you might notice a red rash with swelling of the face, lips or lymph nodes in your neck or under your arms.  You may need blood work done while you are taking this medicine.  This medicine may cause a decrease in vitamin B12. You should make sure that you get enough vitamin B12 while you are taking this medicine. Discuss the foods you eat and the vitamins you take with your health care provider.  What side effects may I notice from receiving this medicine?  Side effects that you should report to your doctor or health care professional as soon as possible:  ·   allergic reactions like skin rash, itching or hives, swelling of the face, lips, or tongue  ·   bone, muscle or joint pain  ·   breathing problems  ·   chest pain or chest tightness  ·   dark yellow or brown urine  ·   dizziness  ·   fast, irregular heartbeat  ·   feeling faint or lightheaded  ·   fever or sore throat  ·   muscle spasm  ·   palpitations  ·   rash on cheeks or arms that gets worse in the sun  ·   redness, blistering, peeling or loosening of the skin, including inside the mouth  ·   seizures  · stomach polyps  ·   tremors  ·   unusual bleeding or bruising  ·   unusually weak or tired  ·   yellowing of the eyes or skin  Side effects that usually do not require medical attention (report to your doctor or health care professional if they continue or are bothersome):  ·   constipation  ·   diarrhea  ·   dry mouth  ·   headache  ·   nausea  This list may not describe all possible side effects. Call your doctor for medical advice about side effects. You may report side effects to FDA at 3-897-FDA-4845.  Where should I keep my medicine?  Keep out of the reach of children.  Store at room temperature between 15 and 30 degrees C (59 and 86 degrees  F). Protect from light and moisture. Throw away any unused medicine after the expiration date.  NOTE: This sheet is a summary. It may not cover all possible information. If you have questions about this medicine, talk to your doctor, pharmacist, or health care provider.  © 2020 [employer] (2020-03-27 13:18:32)    Dysphagia Eating Plan, Bite Size Food  This diet plan is for people with moderate swallowing problems who have transitioned from pureed and minced foods. Bite size foods are soft and cut into small chunks so that they can be swallowed safely. On this eating plan, you may be instructed to drink liquids that are thickened.  Work with your health care provider and your diet and nutrition specialist (dietitian) to make sure that you are following the diet safely and getting all the nutrients you need.  What are tips for following this plan?  General guidelines for foods    · You may eat foods that are tender, soft, and moist.  · Always test food texture before taking a bite. Poke food with a fork or spoon to make sure it is tender.  · Food should be easy to cut and shew. Avoid large pieces of food that require a lot of chewing.  · Take small bites. Each bite should be smaller than your thumb nail (about 15mm by 15 mm).  · If you were on pureed and minced food diet plans, you may eat any of the foods included in those diets.  · Avoid foods that are very dry, hard, sticky, chewy, coarse, or crunchy.  · If instructed by your health care provider, thicken liquids. Follow your health care provider's instructions for what products to use, how to do this, and to what thickness.  ? Your health care provider may recommend using a commercial thickener, rice cereal, or potato flakes. Ask your health care provider to recommend thickeners.  ? Thickened liquids are usually a “pudding-like” consistency, or they may be as thick as honey or thick enough to eat with a spoon.  Cooking  · To moisten foods, you may add  liquids while you are blending, mashing, or grinding your foods to the right consistency. These liquids include gravies, sauces, vegetable or fruit juice, milk, half and half, or water.  · Strain extra liquid from foods before eating.  · Reheat foods slowly to prevent a tough crust from forming.  · Prepare foods in advance.  Meal planning  · Eat a variety of foods to get all the nutrients you need.  · Some foods may be tolerated better than others. Work with your dietitian to identify which foods are safest for you to eat.  · Follow your meal plan as told by your dietitian.  What foods are allowed?  Grains  Moist breads without nuts or seeds. Biscuits, muffins, pancakes, and waffles that are well-moistened with syrup, jelly, margarine, or butter. Cooked cereals. Moist bread stuffing. Moist rice. Well-moistened cold cereal with small chunks. Well-cooked pasta, noodles, rice, and bread dressing in small pieces and thick sauce. Soft dumplings or spaetzle in small pieces and butter or gravy.  Vegetables  Soft, well-cooked vegetables in small pieces. Soft-cooked, mashed potatoes. Thickened vegetable juice.  Fruits  Canned or cooked fruits that are soft or moist and do not have skin or seeds. Fresh, soft bananas. Thickened fruit juices.  Meat and other protein foods  Tender, moist meats or poultry in small pieces. Moist meatballs or meatloaf. Fish without bones. Eggs or egg substitutes in small pieces. Tofu. Tempeh and meat alternatives in small pieces. Well-cooked, tender beans, peas, baked beans, and other legumes.  Dairy  Thickened milk. Cream cheese. Yogurt. Cottage cheese. Sour cream. Small pieces of soft cheese.  Fats and oils  Butter. Oils. Margarine. Mayonnaise. Gravy. Spreads.  Sweets and desserts  Soft, smooth, moist desserts. Pudding. Custard. Moist cakes. Jam. Jelly. Honey. Preserves. Ask your health care provider whether you can have frozen desserts.  Seasoning and other foods  All seasonings and sweeteners.  All sauces with small chunks. Prepared tuna, egg, or chicken salad without raw fruits or vegetables. Moist casseroles with small, tender pieces of meat. Soups with tender meat.  What foods are not allowed?  Grains  Coarse or dry cereals. Dry breads. Toast. Crackers. Tough, crusty breads, such as Tamazight bread and baguettes. Dry pancakes, waffles, and muffins. Sticky rice. Dry bread stuffing. Granola. Popcorn. Chips.  Vegetables  All raw vegetables. Cooked corn. Rubbery or stiff cooked vegetables. Stringy vegetables, such as celery. Tough, crisp fried potatoes. Potato skins.  Fruits  Hard, crunchy, stringy, high-pulp, and juicy raw fruits such as apples, pineapple, papaya, and watermelon. Small, round fruits, such as grapes. Dried fruit and fruit leather.  Meat and other protein foods  Large pieces of meat. Dry, tough meats, such as preciado, sausage, and hot dogs. Chicken, turkey, or fish with skin and bones. Crunchy peanut butter. Nuts. Seeds. Nut and seed butters.  Dairy  Yogurt with nuts, seeds, or large chunks. Large chunks of cheese. Frozen desserts and milk consistency not allowed by your dietitian.  Sweets and desserts  Dry cakes. Chewy or dry cookies. Any desserts with nuts, seeds, dry fruits, coconut, pineapple, or anything dry, sticky, or hard. Chewy caramel. Licorice. Taffy-type candies. Ask your health care provider whether you can have frozen desserts.  Seasoning and other foods  Soups with tough or large chunks of meats, poultry, or vegetables. Corn or clam chowder. Smoothies with large chunks of fruit.  Summary  · Bite size foods can be helpful for people with moderate swallowing problems.  · On the dysphagia eating plan, you may eat foods that are soft, moist, and cut into pieces smaller than 15mm by 15mm.  · You may be instructed to thicken liquids. Follow your health care provider's instructions about how to do this and to what consistency.  This information is not intended to replace advice given to  you by your health care provider. Make sure you discuss any questions you have with your health care provider.  Document Revised: 04/09/2020 Document Reviewed: 03/30/2018  [employer] Patient Education © 2020 [employer]    Managing Bipolar Disorder  When someone is diagnosed with bipolar disorder, the person may be relieved to now know why he or she has felt or behaved a certain way. The person may also feel overwhelmed about the treatment ahead, how to get needed support, and how to deal with the condition each day. With care and support, a person with bipolar disorder can learn to manage his or her symptoms and live with the condition.  How to manage lifestyle changes  Managing stress  Stress is your body's reaction to life changes and events, both good and bad. Stress can play a major role in bipolar disorder, so it is important to learn how to manage stress. Some techniques to help you manage stress include:  · Meditation, muscle relaxation, and breathing exercises.  · Exercise. Even a short daily walk can help to lower stress levels.  · Getting enough good-quality sleep. Too little sleep can cause gail to start (can trigger gail).  · Making a schedule to manage your time. Knowing your daily schedule can help to keep you from feeling overwhelmed by tasks and deadlines.  · Spending time on hobbies you enjoy.    Medicines  Your health care provider may suggest certain medicines if he or she feels that they will help improve your condition. Avoid using caffeine, alcohol, and other substances that may prevent your medicines from working properly. It is also important to:  · Talk with your pharmacist or health care provider about all the medicines that you take, their possible side effects, and which medicines are safe to take together.  · Make it your goal to take part in all treatment decisions (shared decision-making). Ask about possible side effects of medicines that your health care provider recommends, and  tell him or her how you feel about having those side effects. It is best if shared decision-making with your health care provider is part of your total treatment plan.  If you are taking medicines as part of your treatment, do not stop taking medicines before you ask your health care provider if it is safe to stop. You may need to have the medicine slowly decreased (tapered) over time to lower the risk of harmful side effects.  Relationships  Spend time with people whom you trust and with whom you feel a sense of understanding and calm. Try to find friends or family members who make you feel safe and can help you control feelings of gail. Consider going to couples counseling, family education classes, or family therapy to:  · Educate your loved ones about your condition and offer suggestions about how they can support you.  · Help resolve conflicts.  · Help develop communication skills in your relationships.    How to recognize changes in your condition  Everyone responds differently to treatment for bipolar disorder. Some signs that your condition is improving include:  · Leveling of your mood. You may have less anger and excitement about daily activities, and your low moods may not be as bad.  · Your symptoms being less intense.  · Feeling calm more often.  · Thinking clearly.  · Not experiencing consequences for extreme behavior.  · Feeling like your life is settling down.  · Your behavior seeming more normal to you and to other people.  Some signs that your condition may be getting worse include:  · Sleep problems.  · Moods cycling between deep lows and unusually high (excess) energy.  · Extreme emotions.  · More anger at loved ones.  · Staying away from others, or isolating yourself.  · A feeling of power or superiority.  · Completing a lot of tasks in a very short amount of time.  · Unusual thoughts and behaviors.  · Suicidal thoughts.  Follow these instructions at home:  Medicines  · Take over-the-counter  and prescription medicines only as told by your health care provider or pharmacist.  · Ask your pharmacist what over-the-counter cold medicines you should avoid. Some medicines can make symptoms worse.  General instructions  · Ask for support from trusted family members or friends to make sure you stay on track with your treatment.  · Keep a journal to write down your daily moods, medicines, sleep habits, and life events. This may help you have more success with your treatment.  · Make and follow a routine for daily meal times. Eat healthy foods, such as whole grains, vegetables, and fresh fruit.  · Try to go to sleep and wake up around the same time every day.  · Keep all follow-up visits as told by your health care provider. This is important.  Where to find support  Talking to others  · Try making a list of the people you may want to tell about your condition, such as the people you trust most.  · Plan what you are willing and not willing to talk about. Think about your needs ahead of time, and how your friends and family members can support you.  · Let your loved ones know when they can share advice and when you would just like them to listen.  · Give your loved ones information about bipolar disorder, and encourage them to learn about the condition.  Finances  Not all insurance plans cover mental health care, so it is important to check with your insurance carrier. If paying for co-pays or counseling services is a problem, search for a local or [facility] mental health care center. Public mental health care services may be offered there at a low cost or no cost when you are not able to see a private health care provider.  If you are taking medicine for depression, you may be able to get the generic form, which may be less expensive than brand-name medicine. Some makers of prescription medicines also offer help to patients who cannot afford the medicines they need.  Questions to ask your health care provider:  · If  you are taking medicines:  ? How long do I need to take medicine?  ? Are there any long-term side effects of my medicine?  ? Are there any alternatives to taking medicine?  · How would I benefit from therapy?  · How often should I follow up with a health care provider?  Contact a health care provider if:  · Your symptoms get worse or they do not get better with treatment.  Get help right away if:  · You have thoughts about harming yourself or others.  If you ever feel like you may hurt yourself or others, or have thoughts about taking your own life, get help right away. You can go to your nearest emergency department or call:  · Your local emergency services (911 in the [place]  · A suicide crisis helpline, such as the National Suicide Prevention Lifeline at 1-532.787.9381. This is open 24-hours a day.  Summary  · Learning ways to manage stress can help to calm you and may also help your treatment work better.  · There is a wide range of medicines that can help to treat bipolar disorder.  · Having healthy relationships can help to make your moods more stable.  · Contact a health care provider if your symptoms get worse or they do not get better with treatment.  This information is not intended to replace advice given to you by your health care provider. Make sure you discuss any questions you have with your health care provider.  Document Revised: 04/10/2020 Document Reviewed: 04/19/2018  Elsevier Patient Education © 2020 [employer]    Obesity, Adult  Obesity is the condition of having too much total body fat. Being overweight or obese means that your weight is greater than what is considered healthy for your body size. Obesity is determined by a measurement called BMI. BMI is an estimate of body fat and is calculated from height and weight. For adults, a BMI of 30 or higher is considered obese.  Obesity can lead to other health concerns and major illnesses, including:  · Stroke.  · Coronary artery disease  (CAD).  · Type 2 diabetes.  · Some types of cancer, including cancers of the colon, breast, uterus, and gallbladder.  · Osteoarthritis.  · High blood pressure (hypertension).  · High cholesterol.  · Sleep apnea.  · Gallbladder stones.  · Infertility problems.  What are the causes?  Common causes of this condition include:  · Eating daily meals that are high in calories, sugar, and fat.  · Being born with genes that may make you more likely to become obese.  · Having a medical condition that causes obesity, including:  ? Hypothyroidism.  ? Polycystic ovarian syndrome (PCOS).  ? Binge-eating disorder.  ? Cushing syndrome.  · Taking certain medicines, such as steroids, antidepressants, and seizure medicines.  · Not being physically active (sedentary lifestyle).  · Not getting enough sleep.  · Drinking high amounts of sugar-sweetened beverages, such as soft drinks.  What increases the risk?  The following factors may make you more likely to develop this condition:  · Having a family history of obesity.  · Being a woman of  descent.  · Being a man of  descent.  · Living in an area with limited access to:  ? [place], recreation centers, or sidewalks.  ? Healthy food choices, such as grocery stores and farmers' markets.  What are the signs or symptoms?  The main sign of this condition is having too much body fat.  How is this diagnosed?  This condition is diagnosed based on:  · Your BMI. If you are an adult with a BMI of 30 or higher, you are considered obese.  · Your waist circumference. This measures the distance around your waistline.  · Your skinfold thickness. Your health care provider may gently pinch a fold of your skin and measure it.  You may have other tests to check for underlying conditions.  How is this treated?  Treatment for this condition often includes changing your lifestyle. Treatment may include some or all of the following:  · Dietary changes. This may include developing a healthy  meal plan.  · Regular physical activity. This may include activity that causes your heart to beat faster (aerobic exercise) and strength training. Work with your health care provider to design an exercise program that works for you.  · Medicine to help you lose weight if you are unable to lose 1 pound a week after 6 weeks of healthy eating and more physical activity.  · Treating conditions that cause the obesity (underlying conditions).  · Surgery. Surgical options may include gastric banding and gastric bypass. Surgery may be done if:  ? Other treatments have not helped to improve your condition.  ? You have a BMI of 40 or higher.  ? You have life-threatening health problems related to obesity.  Follow these instructions at home:  Eating and drinking    · Follow recommendations from your health care provider about what you eat and drink. Your health care provider may advise you to:  ? Limit fast food, sweets, and processed snack foods.  ? Choose low-fat options, such as low-fat milk instead of whole milk.  ? Eat 5 or more servings of fruits or vegetables every day.  ? Eat at home more often. This gives you more control over what you eat.  ? Choose healthy foods when you eat out.  ? Learn to read food labels. This will help you understand how much food is considered 1 serving.  ? Learn what a healthy serving size is.  ? Keep low-fat snacks available.  ? Limit sugary drinks, such as soda, fruit juice, sweetened iced tea, and flavored milk.  · Drink enough water to keep your urine pale yellow.  · Do not follow a fad diet. Fad diets can be unhealthy and even dangerous.  Physical activity  · Exercise regularly, as told by your health care provider.  ? Most adults should get up to 150 minutes of moderate-intensity exercise every week.  ? Ask your health care provider what types of exercise are safe for you and how often you should exercise.  · Warm up and stretch before being active.  · Cool down and stretch after being  active.  · Rest between periods of activity.  Lifestyle  · Work with your health care provider and a dietitian to set a weight-loss goal that is healthy and reasonable for you.  · Limit your screen time.  · Find ways to reward yourself that do not involve food.  · Do not drink alcohol if:  ? Your health care provider tells you not to drink.  ? You are pregnant, may be pregnant, or are planning to become pregnant.  · If you drink alcohol:  ? Limit how much you use to:  § 0-1 drink a day for women.  § 0-2 drinks a day for men.  ? Be aware of how much alcohol is in your drink. In the [place] one drink equals one 12 oz bottle of beer (355 mL), one 5 oz glass of wine (148 mL), or one 1½ oz glass of hard liquor (44 mL).  General instructions  · Keep a weight-loss journal to keep track of the food you eat and how much exercise you get.  · Take over-the-counter and prescription medicines only as told by your health care provider.  · Take vitamins and supplements only as told by your health care provider.  · Consider joining a support group. Your health care provider may be able to recommend a support group.  · Keep all follow-up visits as told by your health care provider. This is important.  Contact a health care provider if:  · You are unable to meet your weight loss goal after 6 weeks of dietary and lifestyle changes.  Get help right away if you are having:  · Trouble breathing.  · Suicidal thoughts or behaviors.  Summary  · Obesity is the condition of having too much total body fat.  · Being overweight or obese means that your weight is greater than what is considered healthy for your body size.  · Work with your health care provider and a dietitian to set a weight-loss goal that is healthy and reasonable for you.  · Exercise regularly, as told by your health care provider. Ask your health care provider what types of exercise are safe for you and how often you should exercise.  This information is not intended to replace  advice given to you by your health care provider. Make sure you discuss any questions you have with your health care provider.  Document Revised: 08/22/2019 Document Reviewed: 08/22/2019  [employer] Patient Education © 2020 [employer]    Heart-Healthy Eating Plan  Many factors influence your heart (coronary) health, including eating and exercise habits. Coronary risk increases with abnormal blood fat (lipid) levels. Heart-healthy meal planning includes limiting unhealthy fats, increasing healthy fats, and making other diet and lifestyle changes.  What is my plan?  Your health care provider may recommend that you:  · Limit your fat intake to _________% or less of your total calories each day.  · Limit your saturated fat intake to _________% or less of your total calories each day.  · Limit the amount of cholesterol in your diet to less than _________ mg per day.  What are tips for following this plan?  Cooking  Cook foods using methods other than frying. Baking, boiling, grilling, and broiling are all good options. Other ways to reduce fat include:  · Removing the skin from poultry.  · Removing all visible fats from meats.  · Steaming vegetables in water or broth.  Meal planning    · At meals, imagine dividing your plate into fourths:  ? Fill one-half of your plate with vegetables and green salads.  ? Fill one-fourth of your plate with whole grains.  ? Fill one-fourth of your plate with lean protein foods.  · Eat 4-5 servings of vegetables per day. One serving equals 1 cup raw or cooked vegetable, or 2 cups raw leafy greens.  · Eat 4-5 servings of fruit per day. One serving equals 1 medium whole fruit, ¼ cup dried fruit, ½ cup fresh, frozen, or canned fruit, or ½ cup 100% fruit juice.  · Eat more foods that contain soluble fiber. Examples include apples, broccoli, carrots, beans, peas, and barley. Aim to get 25-30 g of fiber per day.  · Increase your consumption of legumes, nuts, and seeds to 4-5 servings per week.  One serving of dried beans or legumes equals ½ cup cooked, 1 serving of nuts is ¼ cup, and 1 serving of seeds equals 1 tablespoon.  Fats  · Choose healthy fats more often. Choose monounsaturated and polyunsaturated fats, such as olive and canola oils, flaxseeds, walnuts, almonds, and seeds.  · Eat more omega-3 fats. Choose salmon, mackerel, sardines, tuna, flaxseed oil, and ground flaxseeds. Aim to eat fish at least 2 times each week.  · Check food labels carefully to identify foods with trans fats or high amounts of saturated fat.  · Limit saturated fats. These are found in animal products, such as meats, butter, and cream. Plant sources of saturated fats include palm oil, palm kernel oil, and coconut oil.  · Avoid foods with partially hydrogenated oils in them. These contain trans fats. Examples are stick margarine, some tub margarines, cookies, crackers, and other baked goods.  · Avoid fried foods.  General information  · Eat more home-cooked food and less restaurant, buffet, and fast food.  · Limit or avoid alcohol.  · Limit foods that are high in starch and sugar.  · Lose weight if you are overweight. Losing just 5-10% of your body weight can help your overall health and prevent diseases such as diabetes and heart disease.  · Monitor your salt (sodium) intake, especially if you have high blood pressure. Talk with your health care provider about your sodium intake.  · Try to incorporate more vegetarian meals weekly.  What foods can I eat?  Fruits  All fresh, canned (in natural juice), or frozen fruits.  Vegetables  Fresh or frozen vegetables (raw, steamed, roasted, or grilled). Green salads.  Grains  Most grains. Choose whole wheat and whole grains most of the time. Rice and pasta, including brown rice and pastas made with whole wheat.  Meats and other proteins  Lean, well-trimmed beef, veal, pork, and lamb. Chicken and turkey without skin. All fish and shellfish. Wild duck, rabbit, pheasant, and venison. Egg  whites or low-cholesterol egg substitutes. Dried beans, peas, lentils, and tofu. Seeds and most nuts.  Dairy  Low-fat or nonfat cheeses, including ricotta and mozzarella. Skim or 1% milk (liquid, powdered, or evaporated). Buttermilk made with low-fat milk. Nonfat or low-fat yogurt.  Fats and oils  Non-hydrogenated (trans-free) margarines. Vegetable oils, including soybean, sesame, sunflower, olive, peanut, safflower, corn, canola, and cottonseed. Salad dressings or mayonnaise made with a vegetable oil.  Beverages  Water (mineral or sparkling). Coffee and tea. Diet carbonated beverages.  Sweets and desserts  Sherbet, gelatin, and fruit ice. Small amounts of dark chocolate.  Limit all sweets and desserts.  Seasonings and condiments  All seasonings and condiments.  The items listed above may not be a complete list of foods and beverages you can eat. Contact a dietitian for more options.  What foods are not recommended?  Fruits  Canned fruit in heavy syrup. Fruit in cream or butter sauce. Fried fruit. Limit coconut.  Vegetables  Vegetables cooked in cheese, cream, or butter sauce. Fried vegetables.  Grains  Breads made with saturated or trans fats, oils, or whole milk. Croissants. Sweet rolls. Donuts. High-fat crackers, such as cheese crackers.  Meats and other proteins  Fatty meats, such as hot dogs, ribs, sausage, preciado, rib-eye roast or steak. High-fat deli meats, such as salami and bologna. Caviar. Domestic duck and goose. Organ meats, such as liver.  Dairy  Cream, sour cream, cream cheese, and creamed cottage cheese. Whole milk cheeses. Whole or 2% milk (liquid, evaporated, or condensed). Whole buttermilk. Cream sauce or high-fat cheese sauce. Whole-milk yogurt.  Fats and oils  Meat fat, or shortening. Cocoa butter, hydrogenated oils, palm oil, coconut oil, palm kernel oil. Solid fats and shortenings, including preciado fat, salt pork, lard, and butter. Nondairy cream substitutes. Salad dressings with cheese or sour  cream.  Beverages  Regular sodas and any drinks with added sugar.  Sweets and desserts  Frosting. Pudding. Cookies. Cakes. Pies. Milk chocolate or white chocolate. Buttered syrups. Full-fat ice cream or ice cream drinks.  The items listed above may not be a complete list of foods and beverages to avoid. Contact a dietitian for more information.  Summary  · Heart-healthy meal planning includes limiting unhealthy fats, increasing healthy fats, and making other diet and lifestyle changes.  · Lose weight if you are overweight. Losing just 5-10% of your body weight can help your overall health and prevent diseases such as diabetes and heart disease.  · Focus on eating a balance of foods, including fruits and vegetables, low-fat or nonfat dairy, lean protein, nuts and legumes, whole grains, and heart-healthy oils and fats.  This information is not intended to replace advice given to you by your health care provider. Make sure you discuss any questions you have with your health care provider.  Document Revised: 01/25/2019 Document Reviewed: 01/25/2019  [employer] Patient Education © 2020 [employer]    Gastroesophageal Reflux Disease, Adult  Gastroesophageal reflux (BRIDGET) happens when acid from the stomach flows up into the tube that connects the mouth and the stomach (esophagus). Normally, food travels down the esophagus and stays in the stomach to be digested. However, when a person has BRIDGET, food and stomach acid sometimes move back up into the esophagus. If this becomes a more serious problem, the person may be diagnosed with a disease called gastroesophageal reflux disease (GERD). GERD occurs when the reflux:  · Happens often.  · Causes frequent or severe symptoms.  · Causes problems such as damage to the esophagus.  When stomach acid comes in contact with the esophagus, the acid may cause soreness (inflammation) in the esophagus. Over time, GERD may create small holes (ulcers) in the lining of the esophagus.  What are  the causes?  This condition is caused by a problem with the muscle between the esophagus and the stomach (lower esophageal sphincter, or LES). Normally, the LES muscle closes after food passes through the esophagus to the stomach. When the LES is weakened or abnormal, it does not close properly, and that allows food and stomach acid to go back up into the esophagus.  The LES can be weakened by certain dietary substances, medicines, and medical conditions, including:  · Tobacco use.  · Pregnancy.  · Having a hiatal hernia.  · Alcohol use.  · Certain foods and beverages, such as coffee, chocolate, onions, and peppermint.  What increases the risk?  You are more likely to develop this condition if you:  · Have an increased body weight.  · Have a connective tissue disorder.  · Use NSAID medicines.  What are the signs or symptoms?  Symptoms of this condition include:  · Heartburn.  · Difficult or painful swallowing.  · The feeling of having a lump in the throat.  · A bitter taste in the mouth.  · Bad breath.  · Having a large amount of saliva.  · Having an upset or bloated stomach.  · Belching.  · Chest pain. Different conditions can cause chest pain. Make sure you see your health care provider if you experience chest pain.  · Shortness of breath or wheezing.  · Ongoing (chronic) cough or a night-time cough.  · Wearing away of tooth enamel.  · Weight loss.  How is this diagnosed?  Your health care provider will take a medical history and perform a physical exam. To determine if you have mild or severe GERD, your health care provider may also monitor how you respond to treatment. You may also have tests, including:  · A test to examine your stomach and esophagus with a small camera (endoscopy).  · A test that measures the acidity level in your esophagus.  · A test that measures how much pressure is on your esophagus.  · A barium swallow or modified barium swallow test to show the shape, size, and functioning of your  esophagus.  How is this treated?  The goal of treatment is to help relieve your symptoms and to prevent complications. Treatment for this condition may vary depending on how severe your symptoms are. Your health care provider may recommend:  · Changes to your diet.  · Medicine.  · Surgery.  Follow these instructions at home:  Eating and drinking    · Follow a diet as recommended by your health care provider. This may involve avoiding foods and drinks such as:  ? Coffee and tea (with or without caffeine).  ? Drinks that contain alcohol.  ? Energy drinks and sports drinks.  ? Carbonated drinks or sodas.  ? Chocolate and cocoa.  ? Peppermint and mint flavorings.  ? Garlic and onions.  ? Horseradish.  ? Spicy and acidic foods, including peppers, chili powder, bruno powder, vinegar, hot sauces, and barbecue sauce.  ? Citrus fruit juices and citrus fruits, such as oranges, gagan, and limes.  ? Tomato-based foods, such as red sauce, chili, salsa, and pizza with red sauce.  ? Fried and fatty foods, such as donuts, french fries, potato chips, and high-fat dressings.  ? High-fat meats, such as hot dogs and fatty cuts of red and white meats, such as rib eye steak, sausage, ham, and preciado.  ? High-fat dairy items, such as whole milk, butter, and cream cheese.  · Eat small, frequent meals instead of large meals.  · Avoid drinking large amounts of liquid with your meals.  · Avoid eating meals during the 2-3 hours before bedtime.  · Avoid lying down right after you eat.  · Do not exercise right after you eat.  Lifestyle    · Do not use any products that contain nicotine or tobacco, such as cigarettes, e-cigarettes, and chewing tobacco. If you need help quitting, ask your health care provider.  · Try to reduce your stress by using methods such as yoga or meditation. If you need help reducing stress, ask your health care provider.  · If you are overweight, reduce your weight to an amount that is healthy for you. Ask your health  care provider for guidance about a safe weight loss goal.  General instructions  · Pay attention to any changes in your symptoms.  · Take over-the-counter and prescription medicines only as told by your health care provider. Do not take aspirin, ibuprofen, or other NSAIDs unless your health care provider told you to do so.  · Wear loose-fitting clothing. Do not wear anything tight around your waist that causes pressure on your abdomen.  · Raise (elevate) the head of your bed about 6 inches (15 cm).  · Avoid bending over if this makes your symptoms worse.  · Keep all follow-up visits as told by your health care provider. This is important.  Contact a health care provider if:  · You have:  ? New symptoms.  ? Unexplained weight loss.  ? Difficulty swallowing or it hurts to swallow.  ? Wheezing or a persistent cough.  ? A hoarse voice.  · Your symptoms do not improve with treatment.  Get help right away if you:  · Have pain in your arms, neck, jaw, teeth, or back.  · Feel sweaty, dizzy, or light-headed.  · Have chest pain or shortness of breath.  · Vomit and your vomit looks like blood or coffee grounds.  · Faint.  · Have stool that is bloody or black.  · Cannot swallow, drink, or eat.  Summary  · Gastroesophageal reflux happens when acid from the stomach flows up into the esophagus. GERD is a disease in which the reflux happens often, causes frequent or severe symptoms, or causes problems such as damage to the esophagus.  · Treatment for this condition may vary depending on how severe your symptoms are. Your health care provider may recommend diet and lifestyle changes, medicine, or surgery.  · Contact a health care provider if you have new or worsening symptoms.  · Take over-the-counter and prescription medicines only as told by your health care provider. Do not take aspirin, ibuprofen, or other NSAIDs unless your health care provider told you to do so.  · Keep all follow-up visits as told by your health care  provider. This is important.  This information is not intended to replace advice given to you by your health care provider. Make sure you discuss any questions you have with your health care provider.  Document Revised: 06/26/2019 Document Reviewed: 06/26/2019  [employer] Patient Education © 2020 [employer]

## 2021-02-02 NOTE — PROGRESS NOTES
"     Follow Up Office Note     Patient Name: Karma Morrissey  : 1995   MRN: [id number]     Chief Complaint:    Chief Complaint   Patient presents with   • Sore Throat       History of Present Illness: Karma Morrissey is a 25 y.o. female who presents today for difficulty swallowing. Patient states that she has intermittent spasms in her throat which occur without warning. She states that this has been going on for several months. Patient has also recently stopped all of her medications which she takes for management of bipolar disorder. She states that her psychiatrist is going to put her on new medications this week.     Subjective      Review of Systems:   Review of Systems   Gastrointestinal: Negative for abdominal distention, abdominal pain, blood in stool, diarrhea, nausea and vomiting.        Past Medical History:   Past Medical History:   Diagnosis Date   • Anxiety    • Bipolar disorder (CMS/[facility])    • Depression          Medications:     Current Outpatient Medications:   •  pantoprazole (PROTONIX) 40 MG EC tablet, Take 1 tablet by mouth Daily., Disp: 30 tablet, Rfl: 1    Allergies:   No Known Allergies      Objective     Physical Exam:  Vital Signs:   Vitals:    21 1424   BP: 128/72   BP Location: Left arm   Patient Position: Sitting   Cuff Size: Adult   Pulse: 88   Resp: 18   Temp: 97.3 °F (36.3 °C)   SpO2: 98%   Weight: (!) 139 kg (307 lb)   Height: 167.6 cm (66\")   PainSc: 0-No pain     Body mass index is 49.55 kg/m².     Physical Exam  Vitals signs and nursing note reviewed.   Constitutional:       General: She is not in acute distress.     Appearance: Normal appearance. She is well-developed. She is not ill-appearing, toxic-appearing or diaphoretic.   HENT:      Head: Normocephalic and atraumatic.      Mouth/Throat:      Lips: Pink.      Mouth: Mucous membranes are moist.      Pharynx: Uvula midline. No pharyngeal swelling, oropharyngeal exudate, posterior oropharyngeal erythema or uvula " swelling.   Neck:      Musculoskeletal: Normal range of motion and neck supple. Normal range of motion. No neck rigidity.      Thyroid: No thyroid mass or thyromegaly.      Trachea: Trachea and phonation normal.   Cardiovascular:      Rate and Rhythm: Normal rate and regular rhythm.      Heart sounds: Normal heart sounds.   Pulmonary:      Effort: Pulmonary effort is normal. No respiratory distress.      Breath sounds: Normal breath sounds. No stridor. No wheezing.   Lymphadenopathy:      Cervical: No cervical adenopathy.   Skin:     General: Skin is warm and dry.   Neurological:      General: No focal deficit present.      Mental Status: She is alert and oriented to person, place, and time.   Psychiatric:         Mood and Affect: Mood normal.         Behavior: Behavior normal. Behavior is cooperative.         Thought Content: Thought content normal.         Judgment: Judgment normal.         Assessment / Plan      Assessment/Plan:   Diagnoses and all orders for this visit:    1. Pharyngoesophageal dysphagia (Primary)  -     pantoprazole (PROTONIX) 40 MG EC tablet; Take 1 tablet by mouth Daily.  Dispense: 30 tablet; Refill: 1  -     Ambulatory referral for Screening EGD  -     TSH; Future (patient states she does not have time for labs today, has to go to work).    2. Bipolar affective disorder, remission status unspecified        -     Continue with psychiatric/behavioral health services for management    3.  Morbidly obese        -     Recommend heart healthy diet, routine aerobic exercise 30 minutes 5 days/week    Discussed the nature of the medical condition(s) risks, complications, implications, management, safe and proper use of medications. Encouraged medication compliance, and keeping scheduled follow up appointments with me and any other providers.      RTC if symptoms fail to improve, to ER if symptoms worsen.      CHRISTIANO Pacheco  [facility] [place]       Please note that portions of this  note may have been completed with a voice recognition program. Efforts were made to edit the dictations, but occasionally words are mistranscribed.

## 2021-02-03 PROBLEM — F31.9 BIPOLAR DISORDER: Chronic | Status: ACTIVE | Noted: 2020-07-15

## 2021-02-03 PROBLEM — E66.01 MORBIDLY OBESE (HCC): Chronic | Status: ACTIVE | Noted: 2020-07-15

## 2021-02-05 ENCOUNTER — LAB (OUTPATIENT)
Dept: FAMILY MEDICINE CLINIC | Facility: CLINIC | Age: 26
End: 2021-02-05

## 2021-02-05 DIAGNOSIS — R13.14 PHARYNGOESOPHAGEAL DYSPHAGIA: ICD-10-CM

## 2021-02-05 LAB — TSH SERPL DL<=0.05 MIU/L-ACNC: 1.55 UIU/ML (ref 0.27–4.2)

## 2021-02-05 PROCEDURE — 84443 ASSAY THYROID STIM HORMONE: CPT | Performed by: NURSE PRACTITIONER

## 2021-02-07 ENCOUNTER — APPOINTMENT (OUTPATIENT)
Dept: PREADMISSION TESTING | Facility: HOSPITAL | Age: 26
End: 2021-02-07

## 2021-02-07 LAB — SARS-COV-2 RNA RESP QL NAA+PROBE: NOT DETECTED

## 2021-02-07 PROCEDURE — C9803 HOPD COVID-19 SPEC COLLECT: HCPCS

## 2021-02-07 PROCEDURE — U0004 COV-19 TEST NON-CDC HGH THRU: HCPCS

## 2021-02-09 ENCOUNTER — OUTSIDE FACILITY SERVICE (OUTPATIENT)
Dept: GASTROENTEROLOGY | Facility: CLINIC | Age: 26
End: 2021-02-09

## 2021-02-09 PROCEDURE — 88305 TISSUE EXAM BY PATHOLOGIST: CPT | Performed by: INTERNAL MEDICINE

## 2021-02-09 PROCEDURE — 43239 EGD BIOPSY SINGLE/MULTIPLE: CPT | Performed by: INTERNAL MEDICINE

## 2021-02-09 PROCEDURE — G0500 MOD SEDAT ENDO SERVICE >5YRS: HCPCS | Performed by: INTERNAL MEDICINE

## 2021-02-10 ENCOUNTER — LAB REQUISITION (OUTPATIENT)
Dept: LAB | Facility: HOSPITAL | Age: 26
End: 2021-02-10

## 2021-02-10 DIAGNOSIS — R13.14 DYSPHAGIA, PHARYNGOESOPHAGEAL PHASE: ICD-10-CM

## 2021-02-11 LAB
CYTO UR: NORMAL
LAB AP CASE REPORT: NORMAL
LAB AP CLINICAL INFORMATION: NORMAL
PATH REPORT.FINAL DX SPEC: NORMAL
PATH REPORT.GROSS SPEC: NORMAL

## 2021-02-12 ENCOUNTER — TELEPHONE (OUTPATIENT)
Dept: GASTROENTEROLOGY | Facility: CLINIC | Age: 26
End: 2021-02-12

## 2021-02-12 NOTE — TELEPHONE ENCOUNTER
----- Message from Jadon Castro MD sent at 2/11/2021  1:06 PM EST -----  Please let Karma Franks she did not have H. pylori.  Dr. Castro

## 2021-04-05 ENCOUNTER — TELEPHONE (OUTPATIENT)
Dept: FAMILY MEDICINE CLINIC | Facility: CLINIC | Age: 26
End: 2021-04-05

## 2021-04-05 NOTE — TELEPHONE ENCOUNTER
Patient was instructed to follow-up in 2 to 3 days with her PCP by the urgent care provider.  She can request a letter to be off work at that time if she is still having shoulder pain.

## 2021-04-05 NOTE — TELEPHONE ENCOUNTER
PT WAS SEEN @ [facility] TODAY FOR SHOULDER PAIN. SHE STATES THAT THE PROVIDER SHE SAW INSTRUCTED HER TO WEAR A SLING FOR A COUPLE OF WEEKS. SHE IS REQUESTING A LETTER TO TAKE OFF OF WORK FOR THE NEXT 2 WEEKS. SHE STATES THAT URGENT CARE WOULD NOT GIVE HER A NOTE.

## 2021-04-07 ENCOUNTER — OFFICE VISIT (OUTPATIENT)
Dept: FAMILY MEDICINE CLINIC | Facility: CLINIC | Age: 26
End: 2021-04-07

## 2021-04-07 VITALS
HEIGHT: 66 IN | BODY MASS INDEX: 47.09 KG/M2 | WEIGHT: 293 LBS | OXYGEN SATURATION: 98 % | RESPIRATION RATE: 18 BRPM | HEART RATE: 97 BPM | DIASTOLIC BLOOD PRESSURE: 74 MMHG | TEMPERATURE: 97.5 F | SYSTOLIC BLOOD PRESSURE: 116 MMHG

## 2021-04-07 DIAGNOSIS — S46.912S STRAIN OF LEFT SHOULDER, SEQUELA: Primary | ICD-10-CM

## 2021-04-07 PROCEDURE — 99213 OFFICE O/P EST LOW 20 MIN: CPT | Performed by: FAMILY MEDICINE

## 2021-04-07 NOTE — PROGRESS NOTES
"Chief Complaint  Shoulder Pain (LT shoulder)    Subjective          Karma Morrissey presents to [facility] for   Shoulder Injury   The incident occurred at home. The left shoulder is affected. Incident onset: pain started 6 days. Injury mechanism: possibly lifting a couch 6 days ago. The quality of the pain is described as aching and cramping. The pain radiates to the left arm. The pain is at a severity of 10/10. The pain is moderate. Pertinent negatives include no numbness or tingling. The symptoms are aggravated by movement, overhead lifting and palpation. She has tried NSAIDs, rest, ice and heat (muscle relaxant) for the symptoms. The treatment provided mild relief.   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    Objective   Vital Signs:   /74   Pulse 97   Temp 97.5 °F (36.4 °C) (Temporal)   Resp 18   Ht 167.6 cm (66\")   Wt (!) 142 kg (313 lb 9.6 oz)   SpO2 98%   BMI 50.62 kg/m²       Review of Systems   Constitutional: Negative for fatigue and fever.   HENT: Negative.    Respiratory: Negative.    Cardiovascular: Negative.    Gastrointestinal: Negative.    Endocrine: Negative.    Musculoskeletal: Positive for arthralgias. Negative for joint swelling, myalgias and neck stiffness.   Neurological: Negative.  Negative for tingling and numbness.     Physical Exam  Vitals and nursing note reviewed.   Constitutional:       General: She is not in acute distress.     Appearance: She is well-developed. She is not diaphoretic.   HENT:      Right Ear: External ear normal.      Left Ear: External ear normal.      Nose: Nose normal.   Eyes:      Conjunctiva/sclera: Conjunctivae normal.      Pupils: Pupils are equal, round, and reactive to light.   Neck:      Thyroid: No thyromegaly.   Cardiovascular:      Rate and Rhythm: Normal rate and regular rhythm.      Heart sounds: Normal heart sounds. No murmur heard.     Pulmonary:      Effort: " Pulmonary effort is normal. No respiratory distress.      Breath sounds: Normal breath sounds. No wheezing.   Abdominal:      General: Bowel sounds are normal. There is no distension.      Palpations: Abdomen is soft. There is no mass.      Tenderness: There is no abdominal tenderness. There is no guarding or rebound.      Hernia: No hernia is present.   Musculoskeletal:      Left shoulder: Tenderness present. No swelling. Decreased range of motion. Decreased strength.      Cervical back: Normal range of motion and neck supple.   Lymphadenopathy:      Cervical: No cervical adenopathy.   Skin:     General: Skin is warm and dry.      Coloration: Skin is not pale.      Findings: No erythema or rash.   Neurological:      Mental Status: She is alert and oriented to person, place, and time.      Deep Tendon Reflexes: Reflexes are normal and symmetric.   Psychiatric:         Behavior: Behavior normal.         Thought Content: Thought content normal.         Judgment: Judgment normal.        Result Review :                 Assessment and Plan    Problem List Items Addressed This Visit     None      Visit Diagnoses     Strain of left shoulder, sequela    -  Primary    Relevant Orders    Ambulatory Referral to Orthopedic Surgery    MRI Shoulder Left Without Contrast      Continue Robaxin and Relafen.    Follow Up   Off work until seen by ortho.  No follow-ups on file.  Patient was given instructions and counseling regarding her condition or for health maintenance advice. Please see specific information pulled into the AVS if appropriate.

## 2021-04-28 ENCOUNTER — HOSPITAL ENCOUNTER (OUTPATIENT)
Dept: MRI IMAGING | Facility: HOSPITAL | Age: 26
Discharge: HOME | End: 2021-04-28
Admitting: FAMILY MEDICINE

## 2021-04-28 DIAGNOSIS — S46.912S STRAIN OF LEFT SHOULDER, SEQUELA: ICD-10-CM

## 2021-04-28 PROCEDURE — 73221 MRI JOINT UPR EXTREM W/O DYE: CPT

## 2021-05-03 ENCOUNTER — OFFICE VISIT (OUTPATIENT)
Dept: ORTHOPEDIC SURGERY | Facility: CLINIC | Age: 26
End: 2021-05-03

## 2021-05-03 VITALS
SYSTOLIC BLOOD PRESSURE: 160 MMHG | BODY MASS INDEX: 47.09 KG/M2 | DIASTOLIC BLOOD PRESSURE: 86 MMHG | WEIGHT: 293 LBS | HEIGHT: 66 IN

## 2021-05-03 DIAGNOSIS — S46.912A STRAIN OF LEFT SHOULDER, INITIAL ENCOUNTER: Primary | ICD-10-CM

## 2021-05-03 PROCEDURE — 99203 OFFICE O/P NEW LOW 30 MIN: CPT | Performed by: ORTHOPAEDIC SURGERY

## 2021-05-03 NOTE — PROGRESS NOTES
"      [facility] Note    Subjective     CC: Pain of the Left Shoulder      HPI    Karma Morrissey is a 25 y.o. female who presents with new problem of: left shoulder pain.  Onset: lifting a couch. . The issue has been ongoing for 1 month(s). Pain is a 9/10 on the pain scale. Pain is described as burning, throbbing and shooting. Associated symptoms include pain and stiffness. The pain is worse with sitting, sleeping and working; heat improve the pain. Previous treatments have included: bracing.    I have reviewed the following portions of the patient's history:History of Present Illness and review of systems.    Injured herself lifting a callus about a month ago.  No treatment yet other than anti-inflammatories and a muscle relaxer.  No therapy yet.    Review of Systems   Constitutional: Negative.  Negative for chills, fatigue and fever.   HENT: Negative.  Negative for congestion and dental problem.    Eyes: Negative.  Negative for blurred vision.   Respiratory: Negative.  Negative for shortness of breath.    Cardiovascular: Negative.  Negative for leg swelling.   Gastrointestinal: Negative.  Negative for abdominal pain.   Endocrine: Negative.  Negative for polyuria.   Genitourinary: Negative.  Negative for difficulty urinating.   Musculoskeletal: Positive for arthralgias.   Skin: Negative.    Allergic/Immunologic: Negative.    Neurological: Negative.    Hematological: Negative.  Negative for adenopathy.   Psychiatric/Behavioral: Negative.  Negative for behavioral problems.       ROS:    Constiutional:Pt denies fever, chills, nausea, or vomiting.  MSK:as above      Objective      Past Medical History  Past Medical History:   Diagnosis Date   • Anxiety    • Bipolar disorder (CMS/HCC)    • Depression          Physical Exam  /86   Ht 167.6 cm (65.98\")   Wt (!) 143 kg (314 lb 3.2 oz)   BMI 50.74 kg/m²     Body mass index is 50.74 kg/m².    Patient is well nourished and well developed.  "       Ortho Exam  Limited active forward flexion abduction to 95 degrees left shoulder.  Strength 4+ out of 5    Imaging/Labs/EMG Reviewed:  Imaging Results (Last 24 Hours)     ** No results found for the last 24 hours. **      I viewed x-rays from April 5 -.  I viewed the MRI from April 28 which shows a rotator cuff strain with no tears    Assessment:  1. Strain of left shoulder, initial encounter        Plan:  1. Recommend over the counter anti-inflammatories for pain and/or swelling  2. I have ordered physical therapy.  She will do that of [facility].  Continue anti-inflammatories.  3. Continue off work until follow-up    Follow Up:   Return in about 1 month (around 6/3/2021).      Medical Decision Making  Management Options : Low - OTC Drugs and OT or PT Therapy  and Moderate - 1 Undiagnosed New Problem with Uncertain Prognosis        Levi Lopez M.D., FAAOS  Orthopedic Surgeon  Fellowship Trained Sports Medicine  [facility]  Orthopedics and Sports Medicine  [address] [place] [address], [address]  [place] [address]

## 2021-05-10 ENCOUNTER — HOSPITAL ENCOUNTER (OUTPATIENT)
Dept: PHYSICAL THERAPY | Facility: HOSPITAL | Age: 26
Setting detail: THERAPIES SERIES
Discharge: HOME | End: 2021-05-10

## 2021-05-10 DIAGNOSIS — S46.912A STRAIN OF LEFT SHOULDER, INITIAL ENCOUNTER: Primary | ICD-10-CM

## 2021-05-10 PROCEDURE — 97161 PT EVAL LOW COMPLEX 20 MIN: CPT | Performed by: GENERAL ACUTE CARE HOSPITAL

## 2021-05-10 NOTE — THERAPY EVALUATION
Outpatient Physical Therapy Ortho Initial Evaluation   [facility]     Patient Name: Karma Morrissey  : 1995  MRN: [id number]  Today's Date: 5/10/2021      Visit Date: 05/10/2021    Patient Active Problem List   Diagnosis   • Strain of extensor muscle of finger at forearm level   • Morbidly obese (CMS/HCC)   • Anxiety   • Bipolar disorder (CMS/HCC)   • Depression        Past Medical History:   Diagnosis Date   • Anxiety    • Bipolar disorder (CMS/HCC)    • Depression         Past Surgical History:   Procedure Laterality Date   • TONSILLECTOMY     • WISDOM TOOTH EXTRACTION         Visit Dx:     ICD-10-CM ICD-9-CM   1. Strain of left shoulder, initial encounter  S46.912A 840.9         Patient History     Row Name 05/10/21 1300             History    Chief Complaint  Pain  -HP      Type of Pain  Shoulder pain  -HP      Date Current Problem(s) Began  -- Around 1 month ago  -      Brief Description of Current Complaint  Pt was moving a couch into her apartment which took longer than expected. She stated the next day she had increased pain in the L shoulder which has stayed until this point.  -      Patient/Caregiver Goals  Relieve pain;Return to prior level of function;Improve mobility;Know what to do to help the symptoms  -HP      Occupation/sports/leisure activities  Worked for [employer] at   -      What clinical tests have you had for this problem?  MRI  -      Results of Clinical Tests  No musculoskelelta injury   -         Pain     Pain Location  Shoulder  -HP      Pain at Present  8  -HP      Pain at Best  5  -HP      Pain at Worst  10  -HP      Pain Frequency  Constant/continuous  -HP      Pain Description  Aching;Discomfort;Penetrating;Radiating;Sharp;Shooting;Tender;Tightness  -      What Performance Factors Make the Current Problem(s) WORSE?  Any use of the L shoulder  -      Pain Comments  Pain is along the entirity of the L shoulder  -HP      Tolerance Time- Standing  WNL   -HP      Tolerance Time- Sitting  WNL  -HP      Tolerance Time- Walking  Some pain noted  -HP      Tolerance Time- Lying  Unable to lay on L side  -HP      Is your sleep disturbed?  Yes  -HP      What position do you sleep in?  Right sidelying  -HP      Difficulties at work?  Currently not working  -HP      Difficulties with ADL's?  Use of L UE  -HP      Difficulties with recreational activities?  Use of L UE  -HP         Fall Risk Assessment    Any falls in the past year:  No  -HP         Daily Activities    Primary Language  English  -HP      Pt Participated in POC and Goals  No  -HP        User Key  (r) = Recorded By, (t) = Taken By, (c) = Cosigned By    Initials Name Provider Type    HP Charles Magallon, PT Physical Therapist          PT Ortho     Row Name 05/10/21 1300       Precautions and Contraindications    Precautions/Limitations  no known precautions/limitations  -HP       Subjective Pain    Able to rate subjective pain?  yes  -HP       Posture/Observations    Posture/Observations Comments  Pt with moderate-severe TTP along the entirity of the shoulder, specifically along the upper trap/levator. Pt unable to tolerate much pressure without extreme pain.  -HP       Quarter Clearing    Quarter Clearing  Upper Quarter Clearing  -HP       Sensory Screen for Light Touch- Upper Quarter Clearing    C4 (posterior shoulder)  Bilateral:;Intact  -HP    C5 (lateral upper arm)  Bilateral:;Intact  -HP    C6 (tip of thumb)  Bilateral:;Intact  -HP    C7 (tip of 3rd finger)  Bilateral:;Intact  -HP    C8 (tip of 5th finger)  Bilateral:;Intact  -HP    T1 (medial lower arm)  Bilateral:;Intact  -HP       General ROM    LT Upper Ext  Lt Shoulder Extension;Lt Shoulder Flexion;Lt Shoulder External Rotation;Lt Shoulder Internal Rotation  -HP       Left Upper Ext    Lt Shoulder Extension AROM  41  -HP    Lt Shoulder Flexion AROM  67   -HP    Lt Shoulder External Rotation AROM  Unable to assess  -HP    Lt Shoulder Internal Rotation  AROM  T12  -HP       MMT (Manual Muscle Testing)    General MMT Comments  Deferred shoulder MMT due to pain, below shoulder MMT WNL  -HP      User Key  (r) = Recorded By, (t) = Taken By, (c) = Cosigned By    Initials Name Provider Type    Charles Amador PT Physical Therapist                      Therapy Education  Education Details: HEP included: pulley flexion, supine cane flexion, standing cane extension, table slides  Given: HEP, Symptoms/condition management, Pain management  Program: New  How Provided: Verbal, Demonstration  Provided to: Patient  Level of Understanding: Verbalized, Demonstrated     PT OP Goals     Row Name 05/10/21 1300          PT Short Term Goals    STG Date to Achieve  05/24/21  -HP     STG 1  Pt to report improvement of symptoms by >/=50%.  -HP     STG 1 Progress  New  -HP     STG 2  Pt to report adherence to HEP to improve functional mobility.  -HP     STG 2 Progress  New  -HP     STG 3  Pt to improve L shoulder flexion AROM to 120 degrees.  -HP     STG 3 Progress  New  -     STG 4  Pt to decrease qDASH score to </=50.  -HP     STG 4 Progress  New  -        Long Term Goals    LTG Date to Achieve  06/07/21  -HP     LTG 1  Pt to report improvement of symptoms by >/=75%.  -HP     LTG 1 Progress  New  -HP     LTG 2  Pt to report independence with HEP to improve functional mobility.  -HP     LTG 2 Progress  New  -HP     LTG 3  Pt to improve L shoulder AROM to withing 90% of normal limits.  -HP     LTG 3 Progress  New  -HP     LTG 4  Pt to decrease qDASH score to </=25.  -     LTG 4 Progress  New  -        Time Calculation    PT Goal Re-Cert Due Date  08/08/21  -       User Key  (r) = Recorded By, (t) = Taken By, (c) = Cosigned By    Initials Name Provider Type    Charles Amador, PT Physical Therapist          PT Assessment/Plan     Row Name 05/10/21 1300          PT Assessment    Functional Limitations  Limitation in home management;Limitations in community  activities;Limitations in functional capacity and performance;Performance in leisure activities  -     Impairments  Joint mobility;Motor function;Muscle strength;Pain;Range of motion  -     Assessment Comments  Pt is a 26 yo female that presents with a low complexity and evolving case of L shoulder pain. The pain started following moving a couch into her apartement about 1 month ago. She stated that the next day or so following she had incresed pain in the shoulder and this has not gone away. She presented today with marked limitations in AROM of the L shoulder specifically with flexion and ER. She also presented with marked moderate to severe tenderness to palpation along the entirity of the L shoulder. An MRI was performed which revealed no musculoskeletal tears. Her signs and symptoms are consistent with muscle tendinitis of the L shoulder.  -     Please refer to paper survey for additional self-reported information  Yes  -HP     Rehab Potential  Good  -     Patient/caregiver participated in establishment of treatment plan and goals  Yes  -     Patient would benefit from skilled therapy intervention  Yes  -HP        PT Plan    PT Frequency  1x/week;2x/week  -     Predicted Duration of Therapy Intervention (PT)  8-10 visits  -     Planned CPT's?  PT EVAL LOW COMPLEXITY: 58365;PT RE-EVAL: 47656;PT THER PROC EA 15 MIN: 17861;PT THER ACT EA 15 MIN: 83357;PT MANUAL THERAPY EA 15 MIN: 67494;PT NEUROMUSC RE-EDUCATION EA 15 MIN: 20487  -     Physical Therapy Interventions (Optional Details)  gross motor skills;home exercise program;joint mobilization;manual therapy techniques;modalities;neuromuscular re-education;patient/family education;postural re-education;ROM (Range of Motion);strengthening;stretching  -     PT Plan Comments  Pt to benefit from PT services with a focus on improving L shoulder AROM, decreasing pain, and improving functional mobility.  -       User Key  (r) = Recorded By, (t) =  Taken By, (c) = Cosigned By    Initials Name Provider Type    Charles Amador PT Physical Therapist            OP Exercises     Row Name 05/10/21 1300             Subjective Pain    Able to rate subjective pain?  yes  -        User Key  (r) = Recorded By, (t) = Taken By, (c) = Cosigned By    Initials Name Provider Type    Charles Amador PT Physical Therapist                        Outcome Measure Options: Quick DASH  Quick DASH  Open a tight or new jar.: Moderate Difficulty  Do heavy household chores (e.g., wash walls, wash floors): Severe Difficulty  Carry a shopping bag or briefcase: Severe Difficulty  Wash your back: Moderate Difficulty  Use a knife to cut food: Mild Difficulty  Recreational activities in which you take some force or impact through your arm, should or hand (e.g. golf, hammering, tennis, etc.): Severe Difficulty  During the past week, to what extent has your arm, shoulder, or hand problem interfered with your normal social activites with family, friends, neighbors or groups?: Quite a bit  During the past week, were you limited in your work or other regular daily activities as a result of your arm, shoulder or hand problem?: Very limited  Arm, Shoulder, or hand pain: Severe  Tingling (pins and needles) in your arm, shoulder, or hand: Severe  During the past week, how much difficulty have you had sleeping because of the pain in your arm, shoulder or hand?: So much Difficulty that I can't sleep  Number of Questions Answered: 11  Quick DASH Score: 68.18         Time Calculation:     Start Time: 1300  Time Calculation- PT  Start Time: 1300  PT Goal Re-Cert Due Date: 08/08/21  Untimed Charges  PT Eval/Re-eval Minutes: 60  Total Minutes  Untimed Charges Total Minutes: 60   Total Minutes: 60     Therapy Charges for Today     Code Description Service Date Service Provider Modifiers Qty    59197453223 HC PT EVAL LOW COMPLEXITY 4 5/10/2021 Charles Magallon, PT GP 1          PT G-Codes  Outcome Measure  Options: Quick DASH  Quick DASH Score: 68.18         Charles Magallon, PT  5/10/2021

## 2021-05-18 ENCOUNTER — HOSPITAL ENCOUNTER (OUTPATIENT)
Dept: PHYSICAL THERAPY | Facility: HOSPITAL | Age: 26
Setting detail: THERAPIES SERIES
Discharge: HOME | End: 2021-05-18

## 2021-05-18 DIAGNOSIS — S46.912A STRAIN OF LEFT SHOULDER, INITIAL ENCOUNTER: Primary | ICD-10-CM

## 2021-05-18 PROCEDURE — 97110 THERAPEUTIC EXERCISES: CPT | Performed by: GENERAL ACUTE CARE HOSPITAL

## 2021-05-18 NOTE — THERAPY TREATMENT NOTE
"    Outpatient Physical Therapy Ortho Treatment Note  [facility]     Patient Name: Karma Morrissey  : 1995  MRN: [id number]  Today's Date: 2021      Visit Date: 2021    Visit Dx:    ICD-10-CM ICD-9-CM   1. Strain of left shoulder, initial encounter  S46.912A 840.9       Patient Active Problem List   Diagnosis   • Strain of extensor muscle of finger at forearm level   • Morbidly obese (CMS/HCC)   • Anxiety   • Bipolar disorder (CMS/HCC)   • Depression        Past Medical History:   Diagnosis Date   • Anxiety    • Bipolar disorder (CMS/HCC)    • Depression         Past Surgical History:   Procedure Laterality Date   • TONSILLECTOMY     • WISDOM TOOTH EXTRACTION                         PT Assessment/Plan     Row Name 21 1300          PT Assessment    Assessment Comments  Pt did well with exercises today. She presented with increased pain in the upper trap and levator region which I believe is due to compensation of the L shoulder due to pain. She also had complaints of \"popping\" of the neck and \"fire-like shooting\" down her L arm. I believe this is also due to tightness of the upper trap and levator. She did well following stretches which was added to her HEP.  -HP        PT Plan    PT Plan Comments  Continue per POC  -HP       User Key  (r) = Recorded By, (t) = Taken By, (c) = Cosigned By    Initials Name Provider Type    Charles Amador, PT Physical Therapist            OP Exercises     Row Name 21 1300             Subjective Comments    Subjective Comments  Pt stated that she still has some pain and soreness with her exercises   -HP         Subjective Pain    Able to rate subjective pain?  yes  -HP      Pre-Treatment Pain Level  5  -HP      Post-Treatment Pain Level  3  -HP         Total Minutes    23200 - PT Therapeutic Exercise Minutes  25  -HP      78912 - PT Manual Therapy Minutes  5  -HP         Exercise 1    Exercise Name 1  Pulley flexion  -HP      Time 1  8 min  -HP         " Exercise 2    Exercise Name 2  Table slides: flexion, scaption, abd  -HP      Reps 2  15 each  -HP         Exercise 3    Exercise Name 3  Seated upper trap and levator stretch  -HP      Reps 3  2  -HP      Time 3  30 sec hold  -HP         Exercise 4    Exercise Name 4  Sidelying shoulder: flexion/ext, abd, ER  -HP      Reps 4  15  -HP         Exercise 5    Exercise Name 5  Seated 2# shoulder flexion  -HP      Reps 5  10  -HP      Additional Comments  pain  -HP        User Key  (r) = Recorded By, (t) = Taken By, (c) = Cosigned By    Initials Name Provider Type     Charles Magallon, PT Physical Therapist                      Manual Rx (last 36 hours)      Manual Treatments     Row Name 05/18/21 1300             Total Minutes    14429 - PT Manual Therapy Minutes  5  -HP         Manual Rx 1    Manual Rx 1 Location  L upper trap/levator  -HP      Manual Rx 1 Type  Soft tissue mobilization with the use of over pressure with stretches for flossing of muscle  -HP      Manual Rx 1 Duration  5  -HP        User Key  (r) = Recorded By, (t) = Taken By, (c) = Cosigned By    Initials Name Provider Type     Charles Magallon PT Physical Therapist                             Time Calculation:   Start Time: 1300  Timed Charges  45310 - PT Therapeutic Exercise Minutes: 25  93641 - PT Manual Therapy Minutes: 5  Total Minutes  Timed Charges Total Minutes: 30   Total Minutes: 30  Therapy Charges for Today     Code Description Service Date Service Provider Modifiers Qty    06113225761  PT THER PROC EA 15 MIN 5/18/2021 Charles Magallon, PT GP 2                    Charles Magallon PT  5/18/2021

## 2021-05-20 ENCOUNTER — HOSPITAL ENCOUNTER (OUTPATIENT)
Dept: PHYSICAL THERAPY | Facility: HOSPITAL | Age: 26
Setting detail: THERAPIES SERIES
Discharge: HOME | End: 2021-05-20

## 2021-05-20 DIAGNOSIS — S46.912A STRAIN OF LEFT SHOULDER, INITIAL ENCOUNTER: Primary | ICD-10-CM

## 2021-05-20 PROCEDURE — 97140 MANUAL THERAPY 1/> REGIONS: CPT | Performed by: GENERAL ACUTE CARE HOSPITAL

## 2021-05-20 PROCEDURE — 97110 THERAPEUTIC EXERCISES: CPT | Performed by: GENERAL ACUTE CARE HOSPITAL

## 2021-05-20 NOTE — THERAPY TREATMENT NOTE
Outpatient Physical Therapy Ortho Treatment Note   [facility]     Patient Name: Karma Morrissey  : 1995  MRN: [id number]  Today's Date: 2021      Visit Date: 2021    Visit Dx:    ICD-10-CM ICD-9-CM   1. Strain of left shoulder, initial encounter  S46.912A 840.9       Patient Active Problem List   Diagnosis   • Strain of extensor muscle of finger at forearm level   • Morbidly obese (CMS/HCC)   • Anxiety   • Bipolar disorder (CMS/HCC)   • Depression        Past Medical History:   Diagnosis Date   • Anxiety    • Bipolar disorder (CMS/HCC)    • Depression         Past Surgical History:   Procedure Laterality Date   • TONSILLECTOMY     • WISDOM TOOTH EXTRACTION                         PT Assessment/Plan     Row Name 21 5133          PT Assessment    Assessment Comments  Pt arrived today with increased shoulder pain and a head ache that was radiating from her neck up to the front of her head. She did well with exercises but still has the biggest complaint of pain with shoulder IR. She had full relief of her headache.   -HP        PT Plan    PT Plan Comments  Continue per POC  -HP       User Key  (r) = Recorded By, (t) = Taken By, (c) = Cosigned By    Initials Name Provider Type    Charles Amador, PT Physical Therapist            OP Exercises     Row Name 21 5370             Subjective Comments    Subjective Comments  Pt stated that her shoulder was hurting today because she felt like she slept on it wrong  -HP         Subjective Pain    Able to rate subjective pain?  yes  -HP      Pre-Treatment Pain Level  8  -HP      Post-Treatment Pain Level  2  -HP         Total Minutes    18281 - PT Therapeutic Exercise Minutes  30  -HP      49403 - PT Manual Therapy Minutes  8  -HP         Exercise 1    Exercise Name 1  Pulley flexion/scaption  -HP      Time 1  8 min  -HP         Exercise 2    Exercise Name 2  UBE  -HP      Time 2  2 min fwd/ 2 min bwd  -HP      Additional Comments  L4  -HP          Exercise 3    Exercise Name 3  3# bar: shoulder flexion, ext  -HP      Reps 3  10  -HP         Exercise 4    Exercise Name 4  Red band: rows  -HP      Reps 4  10  -HP         Exercise 5    Exercise Name 5  Supine towel between shoulder blades shoulder circuit   -HP      Reps 5  15 each  -HP        User Key  (r) = Recorded By, (t) = Taken By, (c) = Cosigned By    Initials Name Provider Type     Charles Magallon, PT Physical Therapist                      Manual Rx (last 36 hours)      Manual Treatments     Row Name 05/20/21 1255             Total Minutes    31946 - PT Manual Therapy Minutes  8  -HP         Manual Rx 1    Manual Rx 1 Location  L upper trap/levator  -HP      Manual Rx 1 Type  Soft tissue mobilization with the use of over pressure with stretches for flossing of muscle  -HP      Manual Rx 1 Duration  8  -HP        User Key  (r) = Recorded By, (t) = Taken By, (c) = Cosigned By    Initials Name Provider Type     Charles Magallon, PT Physical Therapist                             Time Calculation:   Start Time: 1255  Timed Charges  15088 - PT Therapeutic Exercise Minutes: 30  03355 - PT Manual Therapy Minutes: 8  Total Minutes  Timed Charges Total Minutes: 38   Total Minutes: 38  Therapy Charges for Today     Code Description Service Date Service Provider Modifiers Qty    [id number]  PT THER PROC EA 15 MIN 5/20/2021 Charles Magallon, PT GP 2    [id number]  PT MANUAL THERAPY EA 15 MIN 5/20/2021 Charles Magallon, PT GP 1                    Charles Magallon PT  5/20/2021

## 2021-05-24 ENCOUNTER — OFFICE VISIT (OUTPATIENT)
Dept: ORTHOPEDIC SURGERY | Facility: CLINIC | Age: 26
End: 2021-05-24

## 2021-05-24 VITALS
BODY MASS INDEX: 47.09 KG/M2 | HEIGHT: 66 IN | DIASTOLIC BLOOD PRESSURE: 94 MMHG | WEIGHT: 293 LBS | SYSTOLIC BLOOD PRESSURE: 164 MMHG

## 2021-05-24 DIAGNOSIS — S46.912D STRAIN OF LEFT SHOULDER, SUBSEQUENT ENCOUNTER: Primary | ICD-10-CM

## 2021-05-24 DIAGNOSIS — E66.01 OBESITY, MORBID, BMI 50 OR HIGHER (HCC): ICD-10-CM

## 2021-05-24 PROCEDURE — 99213 OFFICE O/P EST LOW 20 MIN: CPT | Performed by: ORTHOPAEDIC SURGERY

## 2021-05-24 RX ORDER — ESCITALOPRAM OXALATE 10 MG/1
TABLET ORAL
COMMUNITY
Start: 2021-05-04

## 2021-05-24 NOTE — PROGRESS NOTES
"      [facility] [place]    Subjective     CC: Follow-up (3 weels follow up Strain of left shoulder)      HPI    Karma Morrissey is a 25 y.o. female.  She says she is only been to 2 physical therapy sessions.  She says she is minimally better.  She says she is off work until she is healed.    Review of Systems   Constitutional: Negative.  Negative for chills, fatigue and fever.   HENT: Negative.  Negative for congestion and dental problem.    Eyes: Negative.  Negative for blurred vision.   Respiratory: Negative.  Negative for shortness of breath.    Cardiovascular: Negative.  Negative for leg swelling.   Gastrointestinal: Negative.  Negative for abdominal pain.   Endocrine: Negative.  Negative for polyuria.   Genitourinary: Negative.  Negative for difficulty urinating.   Musculoskeletal: Positive for arthralgias.   Skin: Negative.    Allergic/Immunologic: Negative.    Neurological: Negative.    Hematological: Negative.  Negative for adenopathy.   Psychiatric/Behavioral: Negative.  Negative for behavioral problems.       ROS:    Constiutional:Pt denies fever, chills, nausea, or vomiting.  MSK:as above      Objective      Past Medical History  Past Medical History:   Diagnosis Date   • Anxiety    • Bipolar disorder (CMS/HCC)    • Depression          Physical Exam  /94   Ht 167.6 cm (65.98\")   Wt (!) 142 kg (314 lb)   BMI 50.71 kg/m²     Body mass index is 50.71 kg/m².    Patient is well nourished and well developed.        Ortho Exam  For flexion abduction only 90 degrees.  Pain with range of motion and strength is 4+ out of 5    Imaging/Labs/EMG Reviewed:  Imaging Results (Last 24 Hours)     ** No results found for the last 24 hours. **           I viewed x-rays from April 5 -.  I viewed the MRI from April 28 which shows a rotator cuff strain with no tears       Assessment:  1. Strain of left shoulder, subsequent encounter    2. Obesity, morbid, BMI 50 or higher (CMS/HCC)  "       Plan:  1. Recommend over the counter anti-inflammatories for pain and/or swelling  2. I have ordered more physical therapy.  She will go to [facility].  3. She is doing poorly and needs to continue anti-inflammatories and weight loss.  4. She is off work until follow-up    Follow Up:   No follow-ups on file.      Medical Decision Making  Management Options : Low - OT or PT Therapy         Levi Lopez M.D., [facility]  Orthopedic Surgeon  Fellowship Trained Sports Medicine  [facility]  Orthopedics and Sports Medicine  [address] [place] [address], [address]  [place] [address]

## 2021-05-25 ENCOUNTER — HOSPITAL ENCOUNTER (OUTPATIENT)
Dept: PHYSICAL THERAPY | Facility: HOSPITAL | Age: 26
Setting detail: THERAPIES SERIES
Discharge: HOME | End: 2021-05-25

## 2021-05-25 DIAGNOSIS — S46.912A STRAIN OF LEFT SHOULDER, INITIAL ENCOUNTER: Primary | ICD-10-CM

## 2021-05-25 PROCEDURE — 97110 THERAPEUTIC EXERCISES: CPT | Performed by: GENERAL ACUTE CARE HOSPITAL

## 2021-05-25 PROCEDURE — 97140 MANUAL THERAPY 1/> REGIONS: CPT | Performed by: GENERAL ACUTE CARE HOSPITAL

## 2021-05-25 NOTE — THERAPY TREATMENT NOTE
Outpatient Physical Therapy Ortho Treatment Note   [facility]     Patient Name: Karma Morrissey  : 1995  MRN: [id number]  Today's Date: 2021      Visit Date: 2021    Visit Dx:    ICD-10-CM ICD-9-CM   1. Strain of left shoulder, initial encounter  S46.912A 840.9       Patient Active Problem List   Diagnosis   • Strain of extensor muscle of finger at forearm level   • Morbidly obese (CMS/HCC)   • Anxiety   • Bipolar disorder (CMS/HCC)   • Depression        Past Medical History:   Diagnosis Date   • Anxiety    • Bipolar disorder (CMS/HCC)    • Depression         Past Surgical History:   Procedure Laterality Date   • TONSILLECTOMY     • WISDOM TOOTH EXTRACTION                         PT Assessment/Plan     Row Name 21 1300          PT Assessment    Assessment Comments  Pt had increased pain today with ER of the shoulder. I believe that this is due to tightness of the pec musculature and also due to increased rounded shoulders that the pt presents with. She does not tolerate much resistance to exercises due to pain. She stated that she is worried she will always have this pain.  -HP        PT Plan    PT Plan Comments  Continue per POC  -HP       User Key  (r) = Recorded By, (t) = Taken By, (c) = Cosigned By    Initials Name Provider Type    Charles Amador, PT Physical Therapist            OP Exercises     Row Name 21 1300             Subjective Comments    Subjective Comments  Pt stated that following last appointment she had some soreness but feels good today  -HP         Subjective Pain    Able to rate subjective pain?  yes  -HP      Pre-Treatment Pain Level  5  -HP      Post-Treatment Pain Level  4  -HP         Total Minutes    06542 - PT Therapeutic Exercise Minutes  30  -HP      20204 - PT Manual Therapy Minutes  8  -HP         Exercise 1    Exercise Name 1  UBE  -HP      Time 1  5 min  -HP         Exercise 2    Exercise Name 2  Red band: pull apart, no $, rows  -HP      Reps 2   10  -HP         Exercise 3    Exercise Name 3  Door stretch  -HP      Time 3  2 min  -HP         Exercise 4    Exercise Name 4  Supine towel between shoulder blades shoulder circuit   -HP      Reps 4  10  -HP         Exercise 5    Exercise Name 5  Supine cane flexion  -HP      Reps 5  10  -HP        User Key  (r) = Recorded By, (t) = Taken By, (c) = Cosigned By    Initials Name Provider Type     Charles Magallon, PT Physical Therapist                      Manual Rx (last 36 hours)      Manual Treatments     Row Name 05/25/21 1300             Total Minutes    09225 - PT Manual Therapy Minutes  8  -HP         Manual Rx 1    Manual Rx 1 Location  L upper trap/levator  -HP      Manual Rx 1 Type  Soft tissue mobilization with the use of over pressure with stretches for flossing of muscle  -HP      Manual Rx 1 Duration  8  -HP        User Key  (r) = Recorded By, (t) = Taken By, (c) = Cosigned By    Initials Name Provider Type     Charles Magallon, PT Physical Therapist                             Time Calculation:   Start Time: 1300  Timed Charges  20871 - PT Therapeutic Exercise Minutes: 30  71112 - PT Manual Therapy Minutes: 8  Total Minutes  Timed Charges Total Minutes: 38   Total Minutes: 38  Therapy Charges for Today     Code Description Service Date Service Provider Modifiers Qty    36886945580  PT THER PROC EA 15 MIN 5/25/2021 Charles Magallon, PT GP 2    [id number]  PT MANUAL THERAPY EA 15 MIN 5/25/2021 Charles Magallon, PT GP 1                    Charles Magallon PT  5/25/2021

## 2021-05-27 ENCOUNTER — HOSPITAL ENCOUNTER (OUTPATIENT)
Dept: PHYSICAL THERAPY | Facility: HOSPITAL | Age: 26
Setting detail: THERAPIES SERIES
Discharge: HOME | End: 2021-05-27

## 2021-05-27 DIAGNOSIS — S46.912A STRAIN OF LEFT SHOULDER, INITIAL ENCOUNTER: Primary | ICD-10-CM

## 2021-05-27 PROCEDURE — 97110 THERAPEUTIC EXERCISES: CPT | Performed by: GENERAL ACUTE CARE HOSPITAL

## 2021-05-27 PROCEDURE — 97140 MANUAL THERAPY 1/> REGIONS: CPT | Performed by: GENERAL ACUTE CARE HOSPITAL

## 2021-05-27 NOTE — THERAPY TREATMENT NOTE
Outpatient Physical Therapy Ortho Treatment Note   [facility]     Patient Name: Karma Morrissey  : 1995  MRN: [id number]  Today's Date: 2021      Visit Date: 2021    Visit Dx:    ICD-10-CM ICD-9-CM   1. Strain of left shoulder, initial encounter  S46.912A 840.9       Patient Active Problem List   Diagnosis   • Strain of extensor muscle of finger at forearm level   • Morbidly obese (CMS/HCC)   • Anxiety   • Bipolar disorder (CMS/HCC)   • Depression        Past Medical History:   Diagnosis Date   • Anxiety    • Bipolar disorder (CMS/HCC)    • Depression         Past Surgical History:   Procedure Laterality Date   • TONSILLECTOMY     • WISDOM TOOTH EXTRACTION                         PT Assessment/Plan     Row Name 21 3767          PT Assessment    Assessment Comments  Pt arrived today with decreased shoulder pain, but was having increased pain and tightness in the L side of her neck. She did well with cervical based exercises and this decreased her pain.  -HP        PT Plan    PT Plan Comments  Continue per POC  -HP       User Key  (r) = Recorded By, (t) = Taken By, (c) = Cosigned By    Initials Name Provider Type    HP Charles Magallon, PT Physical Therapist            OP Exercises     Row Name 21 0925             Subjective Comments    Subjective Comments  Pt stated that her neck is hurting her the most right now  -HP         Subjective Pain    Able to rate subjective pain?  yes  -HP      Pre-Treatment Pain Level  6  -HP      Post-Treatment Pain Level  4  -HP         Total Minutes    11243 - PT Therapeutic Exercise Minutes  25  -HP      43395 - PT Manual Therapy Minutes  15  -HP         Exercise 1    Exercise Name 1  UBE   -HP      Time 1  5 min   -HP         Exercise 2    Exercise Name 2  Red band: rows, pull aparts, no $  -HP      Reps 2  15  -HP         Exercise 3    Exercise Name 3  Supine AROM of cervical spine  -HP      Reps 3  10 each direction  -HP         Exercise 4     Exercise Name 4  Chin tucks  -HP      Reps 4  10  -HP         Exercise 5    Exercise Name 5  Seated cervical ext with towel  -HP      Reps 5  10  -HP        User Key  (r) = Recorded By, (t) = Taken By, (c) = Cosigned By    Initials Name Provider Type     Charles Magallon, PT Physical Therapist                      Manual Rx (last 36 hours)      Manual Treatments     Row Name 05/27/21 1355             Total Minutes    51078 - PT Manual Therapy Minutes  15  -HP         Manual Rx 1    Manual Rx 1 Location  L upper trap/levator & cervical spine  -      Manual Rx 1 Type  Soft tissue mobilization with the use of over pressure with stretches for flossing of muscle; facilitation of movement for cervical spine with OP  -HP      Manual Rx 1 Duration  15  -HP        User Key  (r) = Recorded By, (t) = Taken By, (c) = Cosigned By    Initials Name Provider Type     Charles Magallon, PT Physical Therapist                             Time Calculation:   Start Time: 1355  Timed Charges  15018 - PT Therapeutic Exercise Minutes: 25  94615 - PT Manual Therapy Minutes: 15  Total Minutes  Timed Charges Total Minutes: 40   Total Minutes: 40  Therapy Charges for Today     Code Description Service Date Service Provider Modifiers Qty    [id number]  PT THER PROC EA 15 MIN 5/27/2021 Charles Magallon, PT GP 2    [id number] HC PT MANUAL THERAPY EA 15 MIN 5/27/2021 Charles Magallon, PT GP 1                    Charles Magallon PT  5/27/2021

## 2021-06-03 ENCOUNTER — HOSPITAL ENCOUNTER (OUTPATIENT)
Dept: PHYSICAL THERAPY | Facility: HOSPITAL | Age: 26
Setting detail: THERAPIES SERIES
Discharge: HOME | End: 2021-06-03

## 2021-06-03 DIAGNOSIS — S46.912A STRAIN OF LEFT SHOULDER, INITIAL ENCOUNTER: Primary | ICD-10-CM

## 2021-06-03 PROCEDURE — 97110 THERAPEUTIC EXERCISES: CPT | Performed by: GENERAL ACUTE CARE HOSPITAL

## 2021-06-03 NOTE — THERAPY TREATMENT NOTE
Outpatient Physical Therapy Ortho Treatment Note  [facility]     Patient Name: Karma Morrissey  : 1995  MRN: [id number]  Today's Date: 6/3/2021      Visit Date: 2021    Visit Dx:    ICD-10-CM ICD-9-CM   1. Strain of left shoulder, initial encounter  S46.912A 840.9       Patient Active Problem List   Diagnosis   • Strain of extensor muscle of finger at forearm level   • Morbidly obese (CMS/HCC)   • Anxiety   • Bipolar disorder (CMS/HCC)   • Depression        Past Medical History:   Diagnosis Date   • Anxiety    • Bipolar disorder (CMS/HCC)    • Depression         Past Surgical History:   Procedure Laterality Date   • TONSILLECTOMY     • WISDOM TOOTH EXTRACTION                         PT Assessment/Plan     Row Name 21 1300          PT Assessment    Assessment Comments  Pt did well with therapy today, but still does not tolerate much increased resistance to the L UE. She pain with shoulder abd/scap. Her posture in sitting is with increased L shoulder elevation which I believe may be contributing to her pain. She stated that she has a long history of movement in her hands/feet. She also stated that she has a history of facial drooping on the R side.  -HP        PT Plan    PT Plan Comments  Continue per POC and determine if pt would benefit from neurological consult.  -       User Key  (r) = Recorded By, (t) = Taken By, (c) = Cosigned By    Initials Name Provider Type    Charles Amador, PT Physical Therapist            OP Exercises     Row Name 21 1300             Subjective Comments    Subjective Comments  Pt stated that she took some medication for her shoulder so she was feeling pretty good but still has pain  -HP         Subjective Pain    Able to rate subjective pain?  yes  -HP      Pre-Treatment Pain Level  4  -HP      Post-Treatment Pain Level  4  -HP         Total Minutes    10986 - PT Therapeutic Exercise Minutes  38  -HP         Exercise 1    Exercise Name 1  Red band rows   -HP      Reps 1  15  -HP         Exercise 2    Exercise Name 2  Yellow band: pull aparts, shoulder flexion, shoulder scaption, shoulder abduction  -HP      Reps 2  15 each  -HP         Exercise 3    Exercise Name 3  Doorway stretch arms by side and W  -HP      Time 3  5 min  -HP         Exercise 4    Exercise Name 4  Time to discuss current POC and symptoms with pt  -HP      Time 4  15 min  -HP        User Key  (r) = Recorded By, (t) = Taken By, (c) = Cosigned By    Initials Name Provider Type     Charles Magallon, PT Physical Therapist                                          Time Calculation:   Start Time: 1300  Timed Charges  37614 - PT Therapeutic Exercise Minutes: 38  Total Minutes  Timed Charges Total Minutes: 38   Total Minutes: 38  Therapy Charges for Today     Code Description Service Date Service Provider Modifiers Qty    [id number]  PT THER PROC EA 15 MIN 6/3/2021 Charles Magallon, PT GP 3                    Charles Magallon PT  6/3/2021

## 2021-06-09 ENCOUNTER — HOSPITAL ENCOUNTER (OUTPATIENT)
Dept: PHYSICAL THERAPY | Facility: HOSPITAL | Age: 26
Setting detail: THERAPIES SERIES
Discharge: HOME | End: 2021-06-09

## 2021-06-09 DIAGNOSIS — S46.912A STRAIN OF LEFT SHOULDER, INITIAL ENCOUNTER: Primary | ICD-10-CM

## 2021-06-09 PROCEDURE — 97110 THERAPEUTIC EXERCISES: CPT | Performed by: GENERAL ACUTE CARE HOSPITAL

## 2021-06-09 NOTE — THERAPY DISCHARGE NOTE
Outpatient Physical Therapy Ortho Progress Note/Discharge Summary   [facility]     Patient Name: Karma Morrissey  : 1995  MRN: [id number]  Today's Date: 2021      Visit Date: 2021    Visit Dx:    ICD-10-CM ICD-9-CM   1. Strain of left shoulder, initial encounter  S46.912A 840.9       Patient Active Problem List   Diagnosis   • Strain of extensor muscle of finger at forearm level   • Morbidly obese (CMS/HCC)   • Anxiety   • Bipolar disorder (CMS/HCC)   • Depression        Past Medical History:   Diagnosis Date   • Anxiety    • Bipolar disorder (CMS/HCC)    • Depression         Past Surgical History:   Procedure Laterality Date   • TONSILLECTOMY     • WISDOM TOOTH EXTRACTION         PT Ortho     Row Name 21 1400       Left Upper Ext    Lt Shoulder Flexion AROM  161  -HP    Lt Shoulder Flexion PROM  '  -HP    Lt Shoulder External Rotation AROM  T4  -HP    Lt Shoulder Internal Rotation AROM  T12  -HP      User Key  (r) = Recorded By, (t) = Taken By, (c) = Cosigned By    Initials Name Provider Type     Charles Magallon, PT Physical Therapist                      PT Assessment/Plan     Row Name 21 1300          PT Assessment    Functional Limitations  Performance in leisure activities;Performance in work activities  -     Impairments  Joint mobility;Motor function;Muscle strength;Pain;Range of motion  -     Assessment Comments  Pt arrived today stating that she was having increased shoulder pain as she most recenlty had to carry heavy bags of trash out of her house for a long distance. Her signs and symptoms are consistent with a potential involvement of the rotator cuff, specifically the subscapularis due to pain with [employer] Lift [employer] test. It is my suggestion that she be referred back to MD for further evaluation to determine if subsequent imaging/evaluation would be required. She did not progress to the degree I had hoped she would with therapy.  -HP     Please refer to paper  survey for additional self-reported information  Yes  -HP     Rehab Potential  Fair  -HP     Patient/caregiver participated in establishment of treatment plan and goals  Yes  -HP        PT Plan    PT Plan Comments  Pt to be d/c with referral back to MD to determine if further imaging and/or consult would be required due to pt's lack of progress with therapy.  -HP       User Key  (r) = Recorded By, (t) = Taken By, (c) = Cosigned By    Initials Name Provider Type    Charles Amador, PT Physical Therapist              OP Exercises     Row Name 06/09/21 1300             Subjective Comments    Subjective Comments  Pt stated that she had increased pain as she had to carry big bags of trash the other day   -HP         Subjective Pain    Able to rate subjective pain?  yes  -HP      Pre-Treatment Pain Level  6  -HP      Post-Treatment Pain Level  6  -HP         Total Minutes    86908 - PT Therapeutic Exercise Minutes  38  -HP         Exercise 1    Exercise Name 1  Time to update objective measures and discuss d/c planning  -      Time 1  38  -        User Key  (r) = Recorded By, (t) = Taken By, (c) = Cosigned By    Initials Name Provider Type    Charles Amador, PT Physical Therapist                         PT OP Goals     Row Name 06/09/21 1300          PT Short Term Goals    STG Date to Achieve  05/24/21  -     STG 1  Pt to report improvement of symptoms by >/=50%.  -     STG 1 Progress  Met  -     STG 2  Pt to report adherence to HEP to improve functional mobility.  -     STG 2 Progress  Met  -     STG 3  Pt to improve L shoulder flexion AROM to 120 degrees.  -     STG 3 Progress  Met  -     STG 4  Pt to decrease qDASH score to </=50.  -     STG 4 Progress  Met  -        Long Term Goals    LTG Date to Achieve  06/07/21  -     LTG 1  Pt to report improvement of symptoms by >/=75%.  -     LTG 1 Progress  Not Met  -     LTG 2  Pt to report independence with HEP to improve functional mobility.   -HP     LTG 2 Progress  Met  -HP     LTG 3  Pt to improve L shoulder AROM to withing 90% of normal limits.  -HP     LTG 3 Progress  Not Met  -HP     LTG 4  Pt to decrease qDASH score to </=25.  -HP     LTG 4 Progress  Not Met  -HP        Time Calculation    PT Goal Re-Cert Due Date  08/08/21  -       User Key  (r) = Recorded By, (t) = Taken By, (c) = Cosigned By    Initials Name Provider Type     Charles Magallon, PT Physical Therapist               Outcome Measure Options: Quick DASH  Quick DASH  Open a tight or new jar.: Mild Difficulty  Do heavy household chores (e.g., wash walls, wash floors): Moderate Difficulty  Carry a shopping bag or briefcase: Moderate Difficulty  Wash your back: Severe Difficulty  Use a knife to cut food: No Difficulty  Recreational activities in which you take some force or impact through your arm, should or hand (e.g. golf, hammering, tennis, etc.): Moderate Difficulty  During the past week, to what extent has your arm, shoulder, or hand problem interfered with your normal social activites with family, friends, neighbors or groups?: Slightly  During the past week, were you limited in your work or other regular daily activities as a result of your arm, shoulder or hand problem?: Very limited  Arm, Shoulder, or hand pain: Moderate  Tingling (pins and needles) in your arm, shoulder, or hand: Moderate  During the past week, how much difficulty have you had sleeping because of the pain in your arm, shoulder or hand?: Severe Difficulty  Number of Questions Answered: 11  Quick DASH Score: 47.73         Time Calculation:   Start Time: 1300  Timed Charges  05861 - PT Therapeutic Exercise Minutes: 38  Total Minutes  Timed Charges Total Minutes: 38   Total Minutes: 38  Therapy Charges for Today     Code Description Service Date Service Provider Modifiers Qty    27346900963 HC PT THER PROC EA 15 MIN 6/9/2021 Charles Magallon, PT GP 3          PT G-Codes  Outcome Measure Options: Quick DASH  Quick  DASH Score: 47.73     OP PT Discharge Summary  Date of Discharge: 06/09/21  Reason for Discharge: Lack of progress  Outcomes Achieved: Patient able to partially acheive established goals  Discharge Destination: Home with home program  Discharge Instructions/Additional Comments: Pt to be d/c with referral back to MD to determine if further imaging and/or consult would be required due to pt's lack of progress with therapy.      Charles Magallon, PT  6/9/2021

## 2021-06-28 ENCOUNTER — OFFICE VISIT (OUTPATIENT)
Dept: ORTHOPEDIC SURGERY | Facility: CLINIC | Age: 26
End: 2021-06-28

## 2021-06-28 VITALS
BODY MASS INDEX: 47.09 KG/M2 | DIASTOLIC BLOOD PRESSURE: 87 MMHG | SYSTOLIC BLOOD PRESSURE: 170 MMHG | WEIGHT: 293 LBS | HEART RATE: 93 BPM | HEIGHT: 66 IN

## 2021-06-28 DIAGNOSIS — S46.912D STRAIN OF LEFT SHOULDER, SUBSEQUENT ENCOUNTER: Primary | ICD-10-CM

## 2021-06-28 DIAGNOSIS — E66.01 OBESITY, MORBID, BMI 50 OR HIGHER (HCC): ICD-10-CM

## 2021-06-28 PROCEDURE — 99212 OFFICE O/P EST SF 10 MIN: CPT | Performed by: ORTHOPAEDIC SURGERY

## 2021-06-28 NOTE — PROGRESS NOTES
"      [facility] Note    Subjective     CC: Follow-up (1 month follow up; Strain of left shoulder)      HPI    Karma Morrissey is a 25 y.o. female.  She says she is doing great.  No problems.  Review of Systems   Constitutional: Negative.    HENT: Negative.    Eyes: Negative.    Respiratory: Negative.    Cardiovascular: Negative.    Gastrointestinal: Negative.    Endocrine: Negative.    Genitourinary: Negative.    Musculoskeletal: Positive for arthralgias.   Skin: Negative.    Allergic/Immunologic: Negative.    Neurological: Negative.    Hematological: Negative.    Psychiatric/Behavioral: Negative.        ROS:    Constiutional:Pt denies fever, chills, nausea, or vomiting.  MSK:as above      Objective      Past Medical History  Past Medical History:   Diagnosis Date   • Anxiety    • Bipolar disorder (CMS/HCC)    • Depression          Physical Exam  /87   Pulse 93   Ht 167.6 cm (65.98\")   Wt (!) 145 kg (320 lb)   BMI 51.67 kg/m²     Body mass index is 51.67 kg/m².    Patient is well nourished and well developed.        Ortho Exam  Left shoulder has full motion and full strength.    Imaging/Labs/EMG Reviewed:  Imaging Results (Last 24 Hours)     ** No results found for the last 24 hours. **          Assessment:  1. Strain of left shoulder, subsequent encounter    2. Obesity, morbid, BMI 50 or higher (CMS/HCC)        Plan:  1. Recommend over the counter anti-inflammatories for pain and/or swelling  2. She is doing well and will follow-up as needed  3. She will continue to work on weight loss    Follow Up:   Return if symptoms worsen or fail to improve.      Medical Decision Making  Management Options : Low - OTC Drugs        Levi Lopez M.D., FAAOS  Orthopedic Surgeon  Fellowship Trained Sports Medicine  [facility]  Orthopedics and Sports Medicine  [address], [address]  [place] [address]  "

## 2021-07-19 ENCOUNTER — OFFICE VISIT (OUTPATIENT)
Dept: FAMILY MEDICINE CLINIC | Facility: CLINIC | Age: 26
End: 2021-07-19

## 2021-07-19 VITALS
RESPIRATION RATE: 18 BRPM | HEIGHT: 66 IN | TEMPERATURE: 98.4 F | SYSTOLIC BLOOD PRESSURE: 116 MMHG | BODY MASS INDEX: 47.09 KG/M2 | DIASTOLIC BLOOD PRESSURE: 88 MMHG | HEART RATE: 90 BPM | OXYGEN SATURATION: 99 % | WEIGHT: 293 LBS

## 2021-07-19 DIAGNOSIS — N91.2 AMENORRHEA: ICD-10-CM

## 2021-07-19 DIAGNOSIS — R10.33 PERIUMBILICAL ABDOMINAL PAIN: Primary | ICD-10-CM

## 2021-07-19 DIAGNOSIS — Z32.01 POSITIVE URINE PREGNANCY TEST: ICD-10-CM

## 2021-07-19 LAB
B-HCG UR QL: POSITIVE
INTERNAL NEGATIVE CONTROL: ABNORMAL
INTERNAL POSITIVE CONTROL: ABNORMAL
Lab: ABNORMAL

## 2021-07-19 PROCEDURE — 81025 URINE PREGNANCY TEST: CPT | Performed by: NURSE PRACTITIONER

## 2021-07-19 PROCEDURE — 99213 OFFICE O/P EST LOW 20 MIN: CPT | Performed by: NURSE PRACTITIONER

## 2021-07-19 PROCEDURE — 84702 CHORIONIC GONADOTROPIN TEST: CPT | Performed by: NURSE PRACTITIONER

## 2021-07-19 NOTE — PROGRESS NOTES
Follow Up Office Note     Patient Name: Karma Morrissey  : 1995   MRN: [id number]     Chief Complaint:    Chief Complaint   Patient presents with   • Eating Disorder   • Pain     all over body       History of Present Illness: Karma Morrissey is a 25 y.o. female who presents today with complaint of abdominal pain which is worse after eating x 6 months.  Patient had an EGD at [facility] gastroenterology 2021 which revealed only mild gastritis and a hiatal hernia.  Patient was placed on omeprazole which she has since discontinued as she felt it did not help her symptoms.  I had prescribed patient Protonix in the past but she states that this medication did not improve her symptoms either.  Patient describes her symptoms as a constant burning sensation and fullness in her abdomen which is worsened by eating.  She states that she will occasionally have episodes of diarrhea.  She denies vomiting, melena or hematochezia.    ENDOSCOPY - SCAN (2021)    Subjective      Review of Systems:   Review of Systems   Constitutional: Positive for appetite change. Negative for activity change, chills, diaphoresis and fever.   Respiratory: Negative.    Cardiovascular: Negative.    Gastrointestinal: Positive for abdominal distention, abdominal pain, diarrhea and nausea. Negative for anal bleeding, blood in stool and vomiting.   Genitourinary: Negative.    Musculoskeletal: Negative.    Neurological: Negative.         Past Medical History:   Past Medical History:   Diagnosis Date   • Anxiety    • Bipolar disorder (CMS/[facility])    • Depression      LMP: Patient reports she has not had a period in 5 months.    Medications:     Current Outpatient Medications:   •  escitalopram (LEXAPRO) 10 MG tablet, , Disp: , Rfl:   •  QUEtiapine (SEROquel) 50 MG tablet, Take 50 mg by mouth Every Night., Disp: , Rfl:     Allergies:   No Known Allergies      Objective     Physical Exam:  Vital Signs:   Vitals:    21 1149   BP: 116/88  "  Pulse: 90   Resp: 18   Temp: 98.4 °F (36.9 °C)   TempSrc: Temporal   SpO2: 99%   Weight: (!) 149 kg (328 lb)   Height: 167.6 cm (66\")   PainSc:   2     Body mass index is 52.94 kg/m².     Physical Exam  Vitals and nursing note reviewed.   Constitutional:       General: She is not in acute distress.     Appearance: Normal appearance. She is well-developed. She is not ill-appearing, toxic-appearing or diaphoretic.   HENT:      Head: Normocephalic and atraumatic.   Cardiovascular:      Rate and Rhythm: Normal rate and regular rhythm.      Heart sounds: Normal heart sounds.   Pulmonary:      Effort: Pulmonary effort is normal. No respiratory distress.      Breath sounds: Normal breath sounds. No stridor. No wheezing.   Abdominal:      General: Bowel sounds are decreased. There is no distension.      Palpations: Abdomen is soft.      Tenderness: There is abdominal tenderness in the epigastric area and periumbilical area. There is no right CVA tenderness, left CVA tenderness, guarding or rebound.   Skin:     General: Skin is warm and dry.   Neurological:      General: No focal deficit present.      Mental Status: She is alert and oriented to person, place, and time.   Psychiatric:         Mood and Affect: Mood normal.         Behavior: Behavior normal. Behavior is cooperative.         Thought Content: Thought content normal.         Judgment: Judgment normal.         Assessment / Plan      Assessment/Plan:   Diagnoses and all orders for this visit:    1. Periumbilical abdominal pain (Primary)  -     POC Pregnancy, Urine  -     hCG, Quantitative, Pregnancy  -     Ambulatory Referral to Obstetrics / Gynecology    2. Positive urine pregnancy test  -     hCG, Quantitative, Pregnancy  -     Ambulatory Referral to Obstetrics / Gynecology    3. Amenorrhea  -     POC Pregnancy, Urine  -     hCG, Quantitative, Pregnancy       Brief Urine Lab Results  (Last result in the past 365 days)      Color   Clarity   Blood   Leuk Est   " Nitrite   Protein   CREAT   Urine HCG        07/19/21 1209               Positive           Follow Up:   PRN and at next scheduled appointment(s) with PCP.    Discussed the nature of the medical condition(s) risks, complications, implications, management, safe and proper use of medications. Encouraged medication compliance, and keeping scheduled follow up appointments with me and any other providers.      RTC if symptoms fail to improve, to ER if symptoms worsen.      CHRISTIANO Pacheco  [facility] [place]       Please note that portions of this note may have been completed with a voice recognition program. Efforts were made to edit the dictations, but occasionally words are mistranscribed.

## 2021-07-19 NOTE — PATIENT INSTRUCTIONS
Abdominal Pain, Adult  Pain in the abdomen (abdominal pain) can be caused by many things. Often, abdominal pain is not serious and it gets better with no treatment or by being treated at home. However, sometimes abdominal pain is serious.  Your health care provider will ask questions about your medical history and do a physical exam to try to determine the cause of your abdominal pain.  Follow these instructions at home:  Medicines  · Take over-the-counter and prescription medicines only as told by your health care provider.  · Do not take a laxative unless told by your health care provider.  General instructions    · Watch your condition for any changes.  · Drink enough fluid to keep your urine pale yellow.  · Keep all follow-up visits as told by your health care provider. This is important.  Contact a health care provider if:  · Your abdominal pain changes or gets worse.  · You are not hungry or you lose weight without trying.  · You are constipated or have diarrhea for more than 2-3 days.  · You have pain when you urinate or have a bowel movement.  · Your abdominal pain wakes you up at night.  · Your pain gets worse with meals, after eating, or with certain foods.  · You are vomiting and cannot keep anything down.  · You have a fever.  · You have blood in your urine.  Get help right away if:  · Your pain does not go away as soon as your health care provider told you to expect.  · You cannot stop vomiting.  · Your pain is only in areas of the abdomen, such as the right side or the left lower portion of the abdomen. Pain on the right side could be caused by appendicitis.  · You have bloody or black stools, or stools that look like tar.  · You have severe pain, cramping, or bloating in your abdomen.  · You have signs of dehydration, such as:  ? Dark urine, very little urine, or no urine.  ? Cracked lips.  ? Dry mouth.  ? Sunken eyes.  ? Sleepiness.  ? Weakness.  · You have trouble breathing or chest  pain.  Summary  · Often, abdominal pain is not serious and it gets better with no treatment or by being treated at home. However, sometimes abdominal pain is serious.  · Watch your condition for any changes.  · Take over-the-counter and prescription medicines only as told by your health care provider.  · Contact a health care provider if your abdominal pain changes or gets worse.  · Get help right away if you have severe pain, cramping, or bloating in your abdomen.  This information is not intended to replace advice given to you by your health care provider. Make sure you discuss any questions you have with your health care provider.  Document Revised: 02/05/2021 Document Reviewed: 04/27/2020  [employer] Patient Education © 2021 [employer]    Primary Amenorrhea    Primary amenorrhea is when a female has not started having periods by the age of 15 years.  What are the causes?  This condition may be caused by:  · An abnormal chromosome that causes the ovaries not to work properly (common).  · Malnutrition.  · Low blood sugar (hypoglycemia).  · Polycystic ovary syndrome.  · Being born without a vagina, uterus, or ovaries.  · Extreme obesity.  · Cystic fibrosis.  · Drastic weight loss.  · Over-exercising that leads to a loss of body fat.  · Pituitary gland tumor in the brain.  · Long-term illnesses.  · Cushing disease.  · A thyroid disease, such as hypothyroidism or hyperthyroidism.  · A part of the brain called the hypothalamus not working normally.  · Premature ovarian failure.  What are the signs or symptoms?  The main symptom of this condition is not having had a period by age 15 years. Other symptoms include:  · Discharge from the breasts.  · Hot flashes.  · Adult acne.  · Facial or chest hair.  · Headaches.  · Impaired vision.  · Recent excessive stress.  · Changes in weight, diet, or exercise patterns.  How is this diagnosed?  This condition may be diagnosed based on:  · Your medical history.  · A physical  exam.  · Tests, such as:  ? Blood tests.  ? Urine tests.  How is this treated?  Treatment for this condition depends on the cause. For example, an absence of sex organs will require surgery to correct the problem. Others causes may respond when treated with medicine.  Follow these instructions at home:  · Maintain a healthy diet.  · Maintain a healthy weight.  · Exercise regularly but not excessively.  · Take over-the-counter and prescription medicines only as told by your health care provider.  · Keep all follow-up visits as told by your health care provider. This is important.  Contact a health care provider if:  · Your body is not developing at the level of your peers.  · You have pelvic pain.  · You gain an unusual amount of weight.  · You have an unusual amount of hair growth.  Summary  · Primary amenorrhea is when a female has not started having periods by the age of 15 years.  · This condition has many causes, including abnormal ovaries, obesity, extreme weight loss.  · Contact a health care provider if your body is not developing at the level of your peers.  This information is not intended to replace advice given to you by your health care provider. Make sure you discuss any questions you have with your health care provider.  Document Revised: 06/02/2020 Document Reviewed: 03/06/2018  [employer] Patient Education © 2021 [employer].    Human Chorionic Gonadotropin Test  Why am I having this test?  A human chorionic gonadotropin (hCG) test is done to determine whether you are pregnant. It can also be used:  · To diagnose an abnormal pregnancy.  · To determine whether you have had a failed pregnancy (miscarriage) or are at risk of one.  What is being tested?  This test checks the level of the human chorionic gonadotropin (hCG) hormone in the blood. This hormone is produced during pregnancy by the cells that form the placenta. The placenta is the organ that grows inside your womb (uterus) to nourish a  developing baby. When you are pregnant, hCG can be detected in your blood or urine 7 to 8 days before your missed period. It continues to go up for the first 8-10 weeks of pregnancy.  The presence of hCG in your blood can be measured with several different types of tests. You may have:  · A urine test.  ? Because this hormone is eliminated from your body by your kidneys, you may have a urine test to find out whether you are pregnant. A home pregnancy test detects whether there is hCG in your urine.  ? A urine test only shows whether there is hCG in your urine. It does not measure how much.  · A qualitative blood test.  ? You may have this type of blood test to find out if you are pregnant.  ? This blood test only shows whether there is hCG in your blood. It does not measure how much.  · A quantitative blood test.  ? This type of blood test measures the amount of hCG in your blood.  ? You may have this test to:  § Diagnose an abnormal pregnancy.  § Check whether you have had a miscarriage.  § Determine whether you are at risk of a miscarriage.  What kind of sample is taken?         Two kinds of samples may be collected to test for the hCG hormone.  · Blood. It is usually collected by inserting a needle into a blood vessel.  · Urine. It is usually collected by urinating into a germ-free (sterile) specimen cup. It is best to collect the sample the first time you urinate in the morning.  How do I prepare for this test?  No preparation is needed for a blood test.   For the urine test:  · Let your health care provider know about:  ? All medicines you are taking, including vitamins, herbs, creams, and over-the-counter medicines.  ? Any blood in your urine. This may interfere with the result.  · Do not drink too much fluid. Drink as you normally would, or as directed by your health care provider.  How are the results reported?  Depending on the type of test that you have, your test results may be reported as values. Your  health care provider will compare your results to normal ranges that were established after testing a large group of people (reference ranges). Reference ranges may vary among labs and hospitals. For this test, common reference ranges that show absence of pregnancy are:  · Quantitative hCG blood levels: less than 5 IU/L.  Other results will be reported as either positive or negative. For this test, normal results (meaning the absence of pregnancy) are:  · Negative for hCG in the urine test.  · Negative for hCG in the qualitative blood test.  What do the results mean?  Urine and qualitative blood test  · A negative result could mean:  ? That you are not pregnant.  ? That the test was done too early in your pregnancy to detect hCG in your blood or urine. If you still have other signs of pregnancy, the test will be repeated.  · A positive result means:  ? That you are most likely pregnant. Your health care provider may confirm your pregnancy with an imaging study (ultrasound) of your uterus, if needed.  Quantitative blood test  Results of the quantitative hCG blood test will be interpreted as follows:  · Less than 5 IU/L: You are most likely not pregnant.  · Greater than 25 IU/L: You are most likely pregnant.  · hCG levels that are higher than expected:  ? You are pregnant with twins.  ? You have abnormal growths in the uterus.  · hCG levels that are rising more slowly than expected:  ? You have an ectopic pregnancy (also called a tubal pregnancy).  · hCG levels that are falling:  ? You may be having a miscarriage.  Talk with your health care provider about what your results mean.  Questions to ask your health care provider  Ask your health care provider, or the department that is doing the test:  · When will my results be ready?  · How will I get my results?  · What are my treatment options?  · What other tests do I need?  · What are my next steps?  Summary  · A human chorionic gonadotropin test is done to determine  whether you are pregnant.  · When you are pregnant, hCG can be detected in your blood or urine 7 to 8 days before your missed period. It continues to go up for the first 8-10 weeks of pregnancy.  · Your hCG level can be measured with different types of tests. You may have a urine test, a qualitative blood test, or a quantitative blood test.  · Talk with your health care provider about what your results mean.  This information is not intended to replace advice given to you by your health care provider. Make sure you discuss any questions you have with your health care provider.  Document Revised: 2018 Document Reviewed: 2018  [employer] Patient Education ©  [employer].  Pregnancy Testing  You will be given a list of symptoms you may have if you are in the early stages of pregnancy. You will learn about the types of pregnancy tests (urine, blood) as well.  To view the content, go to this web address:  [email or web address]    This video will  on: 2023. If you need access to this video following this date, please reach out to the healthcare provider who assigned it to you.  This information is not intended to replace advice given to you by your health care provider. Make sure you discuss any questions you have with your health care provider.  [employer] Patient Education ©  [employer].    Prenatal Care  Prenatal care is health care during pregnancy. It helps you and your unborn baby (fetus) stay as healthy as possible. Prenatal care may be provided by a midwife, a family practice health care provider, or a childbirth and pregnancy specialist (obstetrician).  How does this affect me?  During pregnancy, you will be closely monitored for any new conditions that might develop. To lower your risk of pregnancy complications, you and your health care provider will talk about any underlying conditions you have.  How does this affect my baby?  Early and consistent prenatal care  increases the chance that your baby will be healthy during pregnancy. Prenatal care lowers the risk that your baby will be:  · Born early (prematurely).  · Smaller than expected at birth (small for gestational age).  What can I expect at the first prenatal care visit?  Your first prenatal care visit will likely be the longest. You should schedule your first prenatal care visit as soon as you know that you are pregnant. Your first visit is a good time to talk about any questions or concerns you have about pregnancy. At your visit, you and your health care provider will talk about:  · Your medical history, including:  ? Any past pregnancies.  ? Your family's medical history.  ? The baby's father's medical history.  ? Any long-term (chronic) health conditions you have and how you manage them.  ? Any surgeries or procedures you have had.  ? Any current over-the-counter or prescription medicines, herbs, or supplements you are taking.  · Other factors that could pose a risk to your baby, including:  · Your home setting and your stress levels, including:  ? Exposure to abuse or violence.  ? Household financial strain.  ? Mental health conditions you have.  · Your daily health habits, including diet and exercise.  Your health care provider will also:  · Measure your weight, height, and blood pressure.  · Do a physical exam, including a pelvic and breast exam.  · Perform blood tests and urine tests to check for:  ? Urinary tract infection.  ? Sexually transmitted infections (STIs).  ? Low iron levels in your blood (anemia).  ? Blood type and certain proteins on red blood cells (Rh antibodies).  ? Infections and immunity to viruses, such as hepatitis B and rubella.  ? HIV (human immunodeficiency virus).  · Do an ultrasound to confirm your baby's growth and development and to help predict your estimated due date (BRANDEN). This ultrasound is done with a probe that is inserted into the vagina (transvaginal ultrasound).  · Discuss  your options for genetic screening.  · Give you information about how to keep yourself and your baby healthy, including:  ? Nutrition and taking vitamins.  ? Physical activity.  ? How to manage pregnancy symptoms such as nausea and vomiting (morning sickness).  ? Infections and substances that may be harmful to your baby and how to avoid them.  ? Food safety.  ? Dental care.  ? Working.  ? Travel.  ? Warning signs to watch for and when to call your health care provider.  How often will I have prenatal care visits?  After your first prenatal care visit, you will have regular visits throughout your pregnancy. The visit schedule is often as follows:  · Up to week 28 of pregnancy: once every 4 weeks.  · 28-36 weeks: once every 2 weeks.  · After 36 weeks: every week until delivery.  Some women may have visits more or less often depending on any underlying health conditions and the health of the baby.  Keep all follow-up and prenatal care visits as told by your health care provider. This is important.  What happens during routine prenatal care visits?  Your health care provider will:  · Measure your weight and blood pressure.  · Check for fetal heart sounds.  · Measure the height of your uterus in your abdomen (fundal height). This may be measured starting around week 20 of pregnancy.  · Check the position of your baby inside your uterus.  · Ask questions about your diet, sleeping patterns, and whether you can feel the baby move.  · Review warning signs to watch for and signs of labor.  · Ask about any pregnancy symptoms you are having and how you are dealing with them. Symptoms may include:  ? Headaches.  ? Nausea and vomiting.  ? Vaginal discharge.  ? Swelling.  ? Fatigue.  ? Constipation.  ? Any discomfort, including back or pelvic pain.  Make a list of questions to ask your health care provider at your routine visits.  What tests might I have during prenatal care visits?  You may have blood, urine, and imaging tests  throughout your pregnancy, such as:  · Urine tests to check for glucose, protein, or signs of infection.  · Glucose tests to check for a form of diabetes that can develop during pregnancy (gestational diabetes mellitus). This is usually done around week 24 of pregnancy.  · An ultrasound to check your baby's growth and development and to check for birth defects. This is usually done around week 20 of pregnancy.  · A test to check for group B strep (GBS) infection. This is usually done around week 36 of pregnancy.  · Genetic testing. This may include blood or imaging tests, such as an ultrasound. Some genetic tests are done during the first trimester and some are done during the second trimester.  What else can I expect during prenatal care visits?  Your health care provider may recommend getting certain vaccines during pregnancy. These may include:  · A yearly flu shot (annual influenza vaccine). This is especially important if you will be pregnant during flu season.  · Tdap (tetanus, diphtheria, pertussis) vaccine. Getting this vaccine during pregnancy can protect your baby from whooping cough (pertussis) after birth. This vaccine may be recommended between weeks 27 and 36 of pregnancy.  Later in your pregnancy, your health care provider may give you information about:  · Childbirth and breastfeeding classes.  · Choosing a health care provider for your baby.  · Umbilical cord banking.  · Breastfeeding.  · Birth control after your baby is born.  · The hospital labor and delivery unit and how to tour it.  · Registering at the hospital before you go into labor.  Where to find more information  · Office on Women's Health: womenshealth.gov  · American Pregnancy Association: americanpregnancy.org  · March of Dimes: marchofdimes.org  Summary  · Prenatal care helps you and your baby stay as healthy as possible during pregnancy.  · Your first prenatal care visit will most likely be the longest.  · You will have visits and  tests throughout your pregnancy to monitor your health and your baby's health.  · Bring a list of questions to your visits to ask your health care provider.  · Make sure to keep all follow-up and prenatal care visits with your health care provider.  This information is not intended to replace advice given to you by your health care provider. Make sure you discuss any questions you have with your health care provider.  Document Revised: 2020 Document Reviewed: 2018  [employer] Patient Education ©  [employer].  Prenatal Care Overview  After viewing this video, you will be able to describe what to expect from your prenatal care appointments.   To view the content, go to this web address:  [email or web address]    This video will  on: 2023. If you need access to this video following this date, please reach out to the healthcare provider who assigned it to you.  This information is not intended to replace advice given to you by your health care provider. Make sure you discuss any questions you have with your health care provider.  [employer] Patient Education ©  [employer] Inc.  Prenatal Vitamin and Mineral Combinations (oral solid dosage forms)  What is this medicine?  PRENATAL VITAMIN AND MINERAL combinations are used before, during, and after pregnancy to help provide provide good nutrition.  This medicine may be used for other purposes; ask your health care provider or pharmacist if you have questions.  COMMON BRAND NAME(S): BP FoliNatal Plus B, BP MultiNatal Plus, BP MultiNatal Plus Chewable, BP Prenate, Calcium PNV, CareNatal DHA, Cavan Prenatal with EC Calcium, Cavan-Alpha, Cavan-EC SOD DHA, Cavan-Heme OB, Cavan-Heme [place], Centrum Specialist Prenatal, CertaVite with Antioxidants, Choice-OB + DHA, Citracal Prenatal + DHA, CitraNatal 90 DHA, CitraNatal Assure, CitraNatal DHA, CitraNatal [place], ComBi Rx, Complete Butch DHA, Concept DHA, CoreNate-DHA, CRNatal DHA, Duet, Duet  Chewable, Duet DHA, Duet , Duet DHA 430ec, Duet DHA Balanced, Duet DHA Complete, Duet DHA EC, Duet DHA Ferrazone, EC Burwell-3, Elite OB with DHA, Femecal OB Plus DHA, Focalgin 90 DHA, Focalgin CA, Folbecal, Folivane-EC Calcium DHA NF, Foltabs 90 Plus DHA, Foltabs Prenatal, Foltabs Prenatal Plus DHA, Gesticare, Gesticare DHA, Gesticare DHA Delayed-Release, HemeNatal OB + DHA, HIP Prenatal, iNatal Advance, Infanate Balance, Infanate DHA, Infanate Plus, Kolnatal, Lactocal-F, Levomefolate PNV, MACNATAL CN DHA, Marnatal-F Plus, Maxinate, Mom's Choice Rx, Multi-Ej 30, Multi-Ej 30 DHA, Multi-Ej DHA Extra, Multifol Plus, NataChew, NataFolic-OB, Butch-V RX, NatalCare PIC Forte, NatalCare Plus, NATALVIRT 90 DHA, NATALVIRT CA, Natatab Rx, Natelle C, Natelle One, Natelle Plus with DHA, Navatab + DHA, Neevo, Nestabs ABC, Nestabs DHA, Niva-Plus, NovaNatal, Nutri-Tab OB + DHA, OB Complete Petite, OB Complete Premier, OB Complete with DHA, Obtrex DHA, One-A-Day Women's, One-A-Day Women's Prenatal, Paire OB Tablet Plus DHA, PNV OB + DHA, PNV Prenatal, PNV Prenatal Plus Multivitamin, PNV-DHA, PNV-DHA Plus, PNV-First, PNV-Iron, PNV-OB with DHA, PNV-Select, PNV: Ferrous Fumerate/Docusate/Folic Acid, ND  400, ND Butch 400ec, ND Butch 430, ND Butch 430ec, ND  440ec, PreCare, PreferaOB, PreferaOB + DHA, Premesis Rx, Prena1 Plus, Prena1 True, Prenaissance 90 DHA, Prenaissance Balance, Prenaissance DHA, Prenaissance Louisa DHA, Prenaissance Plus, Prenaissance Promise, PrenaPlus, Prenatabs OBN, Prenatabs RX, Prenatal 19, Prenatal AD, Prenatal Low Iron, Prenatal Multivitamin + DHA 2, Prenatal Plus, Prenatal Plus Iron, Prenatal Plus Low Iron, Prenatal Vitamin, PreNatal Vitamins Plus, Prenate Advance, Prenate DHA, Prenate Elite, Prenate Mini, PreNate Plus, Prenate Restore with Quatrefolic, PrePLUS, Previte Rx, PruEt DHA, PruEt DHAec, PureVit DualFe Plus, RE OB + DHA, RE OB 90 + DHA, RE PreVit + DHA, RE-Lena 29, RE-Lena 29  OB, Reaphrim, Violeta, Violeta DHA, Violeta DHA Extra, Logan-Nv, Logan-Nv DHA, Se-Care, Se-Care Conceive, Se-Care Gesture, Se- 19, Se- 90, Se- ONE, Se-Plete DHA, Select-OB + DHA, SetonET, SetonET-EC DHA, StrongStart, Farshad Prenatal + DHA, Bubba A Prenatal Pack with DHA, Bubba EC Calcium DHA Pack, Bubba Prenatal with DHA, Bubba-EC Pastor, Bubba-Prex Prenatal with DHA, Thera  Plus, Thera-Tabs, Thrivite, TL-Care DHA, Tri Rx, Trifera OB, Trimesis Rx, TriStart DHA, Triveen-Duo DHA, Trust Butch DHA, Vena-Bal DHA, Verotin-GR, Vinate C, Vinate Care, Vinate II, Vinate III, Virt-Ej, Virt-PN, Virt-PN DHA, niurka True, Vitafol PN, Vitafol Ultra, Vitafol-Clarisse Prenatal, Vitafol-OB + DHA, Vitafol-OB and DHA, Vitafol-One, VitaMed MD Plus Rx, VitaNatal OB Plus DHA, VitaPhil + DHA, VitaPhil + DHA 90, Vol-Plus, Vol-Tab Rx,  CH Ultra, -CH Plus, -CH-PNV, -HEME OB+ DHA, -PNV-DHA, Zatean-CH, Zatean-Pn, Zatean-Pn DHA  What should I tell my health care provider before I take this medicine?  They need to know if you have any of these conditions:  · bleeding or clotting disorder  · history of anemia of any type  · other chronic health condition  · an unusual or allergic reaction to vitamins, minerals, other medicines, foods, dyes, or preservatives  How should I use this medicine?  Take this medicine by mouth with a glass of water. You can take it with or without food. If it upsets your stomach, take it with food. Chewable prenatal vitamin tablets may be chewed completely before swallowing. Follow the directions on the prescription label. The usual dose is taken once a day. Do not take your medicine more often than directed.  Contact your pediatrician regarding the use of this medicine in children. Special care may be needed. This medicine is intended for females who are pregnant, breast-feeding, or may become pregnant.  Overdosage: If you think you have taken too much of this medicine contact a poison control center  or emergency room at once.  NOTE: This medicine is only for you. Do not share this medicine with others.  What if I miss a dose?  If you miss a dose, take it as soon as you can. If it is almost time for your next dose, take only that dose. Do not take double or extra doses.  What may interact with this medicine?  · alendronate  · antacids  · cefdinir  · cefditoren  · etidronate  · fluoroquinolone antibiotics (examples: ciprofloxacin, gatifloxacin, levofloxacin)  · ibandronate  · levodopa  · risedronate  · tetracycline antibiotics (examples: doxycycline, minocycline, tetracycline)  · thyroid hormones  · warfarin  This list may not describe all possible interactions. Give your health care provider a list of all the medicines, herbs, non-prescription drugs, or dietary supplements you use. Also tell them if you smoke, drink alcohol, or use illegal drugs. Some items may interact with your medicine.  What should I watch for while using this medicine?  See your health care professional for regular checks on your progress. Remember that vitamin and mineral supplements do not replace the need for good nutrition from a balanced diet.  Stools commonly change color when vitamins and minerals are taken. Notify your health care professional if this change is alarming or accompanied by other symptoms, like abdominal pain.  What side effects may I notice from receiving this medicine?  Side effects that you should report to your doctor or health care professional as soon as possible:  · allergic reaction such as skin rash or difficulty breathing  · vomiting  Side effects that usually do not require medical attention (report to your doctor or health care professional if they continue or are bothersome):  · nausea  · stomach upset  This list may not describe all possible side effects. Call your doctor for medical advice about side effects. You may report side effects to FDA at 1-789-FDA-7289.  Where should I keep my medicine?  Keep  out of the reach of children.  Most vitamins and minerals should be stored at controlled room temperature. Check your specific product directions. Protect from heat and moisture. Throw away any unused medicine after the expiration date.  NOTE: This sheet is a summary. It may not cover all possible information. If you have questions about this medicine, talk to your doctor, pharmacist, or health care provider.  © 2021 [employer]/Gold Standard (2019-02-05 09:17:32)

## 2021-07-20 ENCOUNTER — TELEPHONE (OUTPATIENT)
Dept: FAMILY MEDICINE CLINIC | Facility: CLINIC | Age: 26
End: 2021-07-20

## 2021-07-20 LAB — HCG INTACT+B SERPL-ACNC: NORMAL MIU/ML

## 2021-07-20 NOTE — TELEPHONE ENCOUNTER
I called and spoke with patient and informed of beta HCG results. Advised patient to begin prenatal vitamins and that our office with schedule her with an OB/GYN. Patient verbalized understanding and agreement with plan of care.

## 2021-07-29 ENCOUNTER — OFFICE VISIT (OUTPATIENT)
Dept: OBSTETRICS AND GYNECOLOGY | Facility: CLINIC | Age: 26
End: 2021-07-29

## 2021-07-29 ENCOUNTER — LAB (OUTPATIENT)
Dept: LAB | Facility: HOSPITAL | Age: 26
End: 2021-07-29

## 2021-07-29 VITALS
WEIGHT: 293 LBS | BODY MASS INDEX: 47.09 KG/M2 | DIASTOLIC BLOOD PRESSURE: 84 MMHG | SYSTOLIC BLOOD PRESSURE: 132 MMHG | HEIGHT: 66 IN

## 2021-07-29 DIAGNOSIS — O03.9 MISCARRIAGE: ICD-10-CM

## 2021-07-29 DIAGNOSIS — O03.9 MISCARRIAGE: Primary | ICD-10-CM

## 2021-07-29 LAB — HCG INTACT+B SERPL-ACNC: 94.73 MIU/ML

## 2021-07-29 PROCEDURE — 36415 COLL VENOUS BLD VENIPUNCTURE: CPT

## 2021-07-29 PROCEDURE — 99203 OFFICE O/P NEW LOW 30 MIN: CPT | Performed by: OBSTETRICS & GYNECOLOGY

## 2021-07-29 PROCEDURE — 84702 CHORIONIC GONADOTROPIN TEST: CPT

## 2021-07-29 RX ORDER — MISOPROSTOL 200 UG/1
TABLET ORAL
Qty: 4 TABLET | Refills: 0 | Status: SHIPPED | OUTPATIENT
Start: 2021-07-29 | End: 2021-08-05

## 2021-07-29 RX ORDER — HYDROCODONE BITARTRATE AND ACETAMINOPHEN 10; 325 MG/1; MG/1
1 TABLET ORAL DAILY PRN
COMMUNITY
Start: 2021-07-25 | End: 2021-08-06

## 2021-07-29 NOTE — PROGRESS NOTES
"Subjective   Chief Complaint   Patient presents with   • Gynecologic Exam     pt had miscarriage a couple days ago      Karma Morrissey is a 25 y.o. year old .  No LMP recorded (lmp unknown).  She presents to be seen because of ER follow up for miscarriage.  Patient has a longstanding history of irregular cycles. She reports recently she has been having menses every 4-5 months. She reports they last for 7-14 days with heavy flow. She reports that she went PCP a week ago. A pregnancy test was ordered because patient was experiencing nausea and vomiting. b-hcg on  was 14,000. Patient reports she began spotting on  and then on  her bleeding became heavier. She went to the ED in [place]. She reports she had lab work and ultrasound. Her b-hcg was 4599. Her TVUS is not available for review. She reports she was told she had a miscarriage. She was given Rhogam for an A negative blood type. She reports her bleeding has significantly improve and has almost stopped. Denies history of any STIs.     OTHER THINGS SHE WANTS TO DISCUSS TODAY:  Nothing else    The following portions of the patient's history were reviewed and updated as appropriate:current medications, allergies, past family history, past medical history, past social history and past surgical history    Social History    Tobacco Use      Smoking status: Never Smoker      Smokeless tobacco: Never Used    Review of Systems  Constitutional POS: nothing reported    NEG: anorexia or night sweats   Genitourinary POS: nothing reported    NEG: dysuria or hematuria   Gastointestinal POS: nothing reported    NEG: bloating, change in bowel habits, melena or reflux symptoms   Integument POS: nothing reported    NEG: moles that are changing in size, shape, color or rashes   Breast POS: nothing reported    NEG: persistent breast lump, skin dimpling or nipple discharge         Objective   /84   Ht 167.6 cm (66\")   Wt (!) 148 kg (327 lb)   LMP  (LMP " Unknown)   Breastfeeding No   BMI 52.78 kg/m²     General:  well developed; well nourished  no acute distress   Skin:  Not performed.   Thyroid: not examined   Lungs:  breathing is unlabored   Heart:  Not performed.   Breasts:  Not performed.   Abdomen: soft, non-tender; no masses  no umbilical or inguinal hernias are present  no hepato-splenomegaly   Pelvis: Exam limited by  body habitus  Clinical staff was present for exam  External genitalia:  normal appearance of the external genitalia including Bartholin's and [place]'s glands.  :  urethral meatus normal;  Vaginal:  normal pink mucosa without prolapse or lesions. blood present -  moderate amount and dark red;  Cervix:  POCS seen protruding from the cervical os  Uterus:  symmetrically enlarged, consisent with 8-9 weeks size;  Adnexa:  normal bimanual exam of the adnexa.     Lab Review   CBC, HCG and ABO/Rh    Imaging   Pelvic ultrasound report        Assessment   1. Incomplete      Plan   1. Exam revealed what is likely POCs protruding from the cervical os. Specimen was sent to pathology for evaluation. An ultrasound was obtained after the exam revealing no evidence of an IUP, ES of 8.1 mm, no adnexal masses, heterogenous collection at the level of the ELISE and cervix. A prescription for a one time dose of cytotec 800 mcg pv was given to help facilitate the completion of this miscarriage. While I feel it is consistent with a miscarriage, an IUP was never visualized and thus recommend trending b-hcg to zero. A b-hcg was ordered today. Bleeding, pain and infection precautions given.   2. The importance of keeping all planned follow-up and taking all medications as prescribed was emphasized.  3. Follow up for recheck of miscarriage in 1 week.     New Medications Ordered This Visit   Medications   • miSOPROStol (Cytotec) 200 MCG tablet     Sig: Place 4 tablets into the vagina one time.     Dispense:  4 tablet     Refill:  0          This note was  electronically signed.    Ciarra Adams, DO  July 29, 2021    Note: Speech recognition transcription software may have been used to create portions of this document.  An attempt at proofreading has been made but errors in transcription could still be present.

## 2021-08-03 PROCEDURE — U0004 COV-19 TEST NON-CDC HGH THRU: HCPCS | Performed by: FAMILY MEDICINE

## 2021-08-04 ENCOUNTER — TELEPHONE (OUTPATIENT)
Dept: URGENT CARE | Facility: CLINIC | Age: 26
End: 2021-08-04

## 2021-08-05 ENCOUNTER — OFFICE VISIT (OUTPATIENT)
Dept: OBSTETRICS AND GYNECOLOGY | Facility: CLINIC | Age: 26
End: 2021-08-05

## 2021-08-05 ENCOUNTER — LAB (OUTPATIENT)
Dept: LAB | Facility: HOSPITAL | Age: 26
End: 2021-08-05

## 2021-08-05 VITALS
WEIGHT: 293 LBS | BODY MASS INDEX: 45.99 KG/M2 | SYSTOLIC BLOOD PRESSURE: 136 MMHG | HEIGHT: 67 IN | DIASTOLIC BLOOD PRESSURE: 84 MMHG

## 2021-08-05 DIAGNOSIS — O03.9 MISCARRIAGE: Primary | ICD-10-CM

## 2021-08-05 DIAGNOSIS — O03.9 MISCARRIAGE: ICD-10-CM

## 2021-08-05 LAB — HCG INTACT+B SERPL-ACNC: 6.61 MIU/ML

## 2021-08-05 PROCEDURE — 84702 CHORIONIC GONADOTROPIN TEST: CPT

## 2021-08-05 PROCEDURE — 36415 COLL VENOUS BLD VENIPUNCTURE: CPT

## 2021-08-05 PROCEDURE — 99213 OFFICE O/P EST LOW 20 MIN: CPT | Performed by: OBSTETRICS & GYNECOLOGY

## 2021-08-05 NOTE — PROGRESS NOTES
"Subjective   Chief Complaint   Patient presents with   • Follow-up     u/s before appt     Karma Morrissey is a 25 y.o. year old .  No LMP recorded (lmp unknown).  She presents to be seen because of follow up from incomplete . She was seen last week for ER follow up for miscarriage. Ultrasound and exam revealed an incomplete . She was given a one time dose of cytotec 800 mcg pv. She reports he bleeding has stopped.     OTHER THINGS SHE WANTS TO DISCUSS TODAY:  Nothing else    The following portions of the patient's history were reviewed and updated as appropriate:current medications and allergies    Social History    Tobacco Use      Smoking status: Never Smoker      Smokeless tobacco: Never Used    Review of Systems  Constitutional POS: nothing reported    NEG: anorexia or night sweats   Genitourinary POS: nothing reported    NEG: dysuria or hematuria   Gastointestinal POS: nothing reported    NEG: bloating, change in bowel habits, melena or reflux symptoms   Integument POS: nothing reported    NEG: moles that are changing in size, shape, color or rashes   Breast POS: nothing reported    NEG: persistent breast lump, skin dimpling or nipple discharge         Objective   /84   Ht 168.9 cm (66.5\")   Wt (!) 147 kg (324 lb 6.4 oz)   LMP  (LMP Unknown)   Breastfeeding No   BMI 51.58 kg/m²     General:  well developed; well nourished  no acute distress   Skin:  Not performed.   Thyroid: not examined   Lungs:  breathing is unlabored   Heart:  Not performed.   Breasts:  Not performed.   Abdomen: soft, non-tender; no masses  no umbilical or inguinal hernias are present  no hepato-splenomegaly   Pelvis: Clinical staff was present for exam  External genitalia:  normal appearance of the external genitalia including Bartholin's and [place]'s glands.  :  urethral meatus normal;  Vaginal:  normal pink mucosa without prolapse or lesions.  Cervix:  normal appearance.  Uterus:  normal size, shape and " consistency.  Adnexa:  normal bimanual exam of the adnexa.     Lab Review   HCG    Imaging   Pelvic ultrasound report        Assessment   1. Likely complete      Plan   1. US reviewed -- previously seen heterogenous collection at the level of the lower uterine segment/cervix is no longer evident. Her bleeding has stopped. Did explain to the patient that given an IUP was not definitely established will need to follow b-hcgs to a non-pregnant value. Patient verbalized understanding.  2. The importance of keeping all planned follow-up and taking all medications as prescribed was emphasized.  3. Follow up for annual exam in 3-4 months.     No orders of the defined types were placed in this encounter.         This note was electronically signed.    Ciarra Adams, DO  2021    Note: Speech recognition transcription software may have been used to create portions of this document.  An attempt at proofreading has been made but errors in transcription could still be present.

## 2021-08-12 ENCOUNTER — LAB (OUTPATIENT)
Dept: LAB | Facility: HOSPITAL | Age: 26
End: 2021-08-12

## 2021-08-12 DIAGNOSIS — O03.9 MISCARRIAGE: ICD-10-CM

## 2021-08-12 PROCEDURE — 36415 COLL VENOUS BLD VENIPUNCTURE: CPT

## 2021-08-12 PROCEDURE — 84702 CHORIONIC GONADOTROPIN TEST: CPT

## 2021-08-13 LAB — HCG INTACT+B SERPL-ACNC: 2.47 MIU/ML

## 2021-08-17 ENCOUNTER — OFFICE VISIT (OUTPATIENT)
Dept: FAMILY MEDICINE CLINIC | Facility: CLINIC | Age: 26
End: 2021-08-17

## 2021-08-17 ENCOUNTER — TELEPHONE (OUTPATIENT)
Dept: FAMILY MEDICINE CLINIC | Facility: CLINIC | Age: 26
End: 2021-08-17

## 2021-08-17 VITALS
OXYGEN SATURATION: 99 % | WEIGHT: 293 LBS | DIASTOLIC BLOOD PRESSURE: 84 MMHG | HEART RATE: 109 BPM | TEMPERATURE: 98 F | BODY MASS INDEX: 47.09 KG/M2 | RESPIRATION RATE: 20 BRPM | SYSTOLIC BLOOD PRESSURE: 124 MMHG | HEIGHT: 66 IN

## 2021-08-17 DIAGNOSIS — O03.9 MISCARRIAGE: Primary | ICD-10-CM

## 2021-08-17 DIAGNOSIS — G43.909 MIGRAINE WITHOUT STATUS MIGRAINOSUS, NOT INTRACTABLE, UNSPECIFIED MIGRAINE TYPE: Primary | ICD-10-CM

## 2021-08-17 PROCEDURE — 99213 OFFICE O/P EST LOW 20 MIN: CPT | Performed by: NURSE PRACTITIONER

## 2021-08-17 RX ORDER — RIMEGEPANT SULFATE 75 MG/75MG
1 TABLET, ORALLY DISINTEGRATING ORAL DAILY PRN
Qty: 9 TABLET | Refills: 2 | Status: SHIPPED | OUTPATIENT
Start: 2021-08-17 | End: 2021-09-03

## 2021-08-17 NOTE — TELEPHONE ENCOUNTER
Contacted pt, pt is unsure of which medication needs an PA. Pt is going to call pharmacy and then call back.

## 2021-08-17 NOTE — PROGRESS NOTES
"     Follow Up Office Note     Patient Name: Karma Morrissey  : 1995   MRN: [id number]     Chief Complaint:    Chief Complaint   Patient presents with   • Headache       History of Present Illness: Karma Morrissey is a 25 y.o. female who presents today status post [facility] [place] [place] [facility] visit on 2021 for headache.  Patient was administered a migraine cocktail which improved her symptoms.  She states she has had issues with \"migraines\" since childhood, however she has never had a neurological evaluation for this.  She would like to discuss starting medication to treat her migraine symptoms.  Patient recently suffered a miscarriage, and states her migraines worsened thereafter.  She is currently following with Dr. Ciarra Adams at [facility].     Headache   This is a chronic problem. The current episode started more than 1 year ago. The problem occurs intermittently. Progression since onset: Worsening recently. The pain is located in the frontal and retro-orbital region. The pain does not radiate. The quality of the pain is described as aching, throbbing and squeezing. Associated symptoms include photophobia. Pertinent negatives include no abdominal pain, blurred vision, dizziness, ear pain, fever, hearing loss, loss of balance, nausea, numbness, seizures, sore throat, tingling, vomiting or weakness. Exacerbated by: Stress. She has tried NSAIDs for the symptoms. The treatment provided no relief. Her past medical history is significant for migraine headaches and migraines in the family.        Subjective      Review of Systems:   Review of Systems   Constitutional: Negative for activity change, appetite change, chills, diaphoresis, fatigue and fever.   HENT: Negative for congestion, ear pain, facial swelling, hearing loss, sinus pain, sore throat, trouble swallowing and voice change.    Eyes: Positive for photophobia. Negative for blurred vision.   Respiratory: Negative.    Cardiovascular: Negative.  " "  Gastrointestinal: Negative for abdominal pain, nausea and vomiting.   Neurological: Positive for headaches. Negative for dizziness, tingling, tremors, seizures, syncope, facial asymmetry, speech difficulty, weakness, light-headedness, numbness and loss of balance.        Past Medical History:   Past Medical History:   Diagnosis Date   • Anxiety    • Bipolar disorder (CMS/HCC)    • Depression        Medications:     Current Outpatient Medications:   •  escitalopram (LEXAPRO) 10 MG tablet, , Disp: , Rfl:   •  ibuprofen (ADVIL,MOTRIN) 800 MG tablet, Take 1 tablet by mouth Every 8 (Eight) Hours As Needed for Mild Pain ., Disp: 90 tablet, Rfl: 0  •  QUEtiapine (SEROquel) 50 MG tablet, Take 50 mg by mouth Every Night., Disp: , Rfl:   •  Rimegepant Sulfate (Nurtec) 75 MG tablet dispersible tablet, Take 1 tablet by mouth Daily As Needed (migraine) for up to 1 dose. Maximum one dose 24/hours., Disp: 9 tablet, Rfl: 2    Allergies:   No Known Allergies      Objective     Physical Exam:  Vital Signs:   Vitals:    08/17/21 1304   BP: 124/84   Pulse: 109   Resp: 20   Temp: 98 °F (36.7 °C)   SpO2: 99%   Weight: (!) 148 kg (327 lb)   Height: 167.6 cm (66\")   PainSc: 0-No pain     Body mass index is 52.78 kg/m².     Physical Exam  Vitals and nursing note reviewed.   Constitutional:       General: She is not in acute distress.     Appearance: Normal appearance. She is well-developed. She is not ill-appearing, toxic-appearing or diaphoretic.   HENT:      Head: Normocephalic and atraumatic.   Cardiovascular:      Rate and Rhythm: Normal rate and regular rhythm.      Heart sounds: Normal heart sounds.   Pulmonary:      Effort: Pulmonary effort is normal. No respiratory distress.      Breath sounds: Normal breath sounds. No stridor. No wheezing.   Skin:     General: Skin is warm and dry.   Neurological:      General: No focal deficit present.      Mental Status: She is alert and oriented to person, place, and time.   Psychiatric:       "   Mood and Affect: Mood normal.         Behavior: Behavior normal. Behavior is cooperative.         Thought Content: Thought content normal.         Judgment: Judgment normal.         Assessment / Plan      Assessment/Plan:   Diagnoses and all orders for this visit:    1. Migraine without status migrainosus, not intractable, unspecified migraine type (Primary)  -     Rimegepant Sulfate (Nurtec) 75 MG tablet dispersible tablet; Take 1 tablet by mouth Daily As Needed (migraine) for up to 1 dose. Maximum one dose 24/hours.  Dispense: 9 tablet; Refill: 2  -     Ambulatory Referral to Neurology        -     Samples of Nurtec provided to patient and instructed on use    Follow Up:   PRN and at next scheduled appointment(s) with PCP.  Patient needs appointment for annual physical exam with fasting labs.    Discussed the nature of the medical condition(s) risks, complications, implications, management, safe and proper use of medications. Encouraged medication compliance, and keeping scheduled follow up appointments with me and any other providers.      RTC if symptoms fail to improve, to ER if symptoms worsen.      CHRISTIANO Pacheco  [facility]       Please note that portions of this note may have been completed with a voice recognition program. Efforts were made to edit the dictations, but occasionally words are mistranscribed.

## 2021-08-17 NOTE — TELEPHONE ENCOUNTER
Patient called back to let provider know that it is Rimegepant Sulfate (Nurtec) 75 MG tablet dispersible tablet that needs a prior authorization

## 2021-08-17 NOTE — TELEPHONE ENCOUNTER
Caller: Karma Morrissey    Relationship: Self    Best call back number: [phone]    Medication needed:     PATIENT UNABLE TO PROVIDE NAME OF MEDICATION           When do you need the refill by: 8/17/21    What additional details did the patient provide when requesting the medication: PATIENT STATED PHARMACY ALERTED PATIENT THAT ONE OF HER MEDICATIONS THAT DOCTOR CRAFT PRESCRIBED REQUIRES A PRIOR AUTHORIZATION.      PATIENT UNABLE TO PROVIDE NAME OF MEDICATION.    Does the patient have less than a 3 day supply:  [x] Yes  [] No    What is the patient's preferred pharmacy: [facility] AT [facility] [address] [place] [address] - [phone]  - [phone] FX

## 2021-08-17 NOTE — PATIENT INSTRUCTIONS
Migraine Headache  A migraine headache is a very strong throbbing pain on one side or both sides of your head. This type of headache can also cause other symptoms. It can last from 4 hours to 3 days. Talk with your doctor about what things may bring on (trigger) this condition.  What are the causes?  The exact cause of this condition is not known. This condition may be triggered or caused by:  · Drinking alcohol.  · Smoking.  · Taking medicines, such as:  ? Medicine used to treat chest pain (nitroglycerin).  ? Birth control pills.  ? Estrogen.  ? Some blood pressure medicines.  · Eating or drinking certain products.  · Doing physical activity.  Other things that may trigger a migraine headache include:  · Having a menstrual period.  · Pregnancy.  · Hunger.  · Stress.  · Not getting enough sleep or getting too much sleep.  · Weather changes.  · Tiredness (fatigue).  What increases the risk?  · Being 25-55 years old.  · Being female.  · Having a family history of migraine headaches.  · Being .  · Having depression or anxiety.  · Being very overweight.  What are the signs or symptoms?  · A throbbing pain. This pain may:  ? Happen in any area of the head, such as on one side or both sides.  ? Make it hard to do daily activities.  ? Get worse with physical activity.  ? Get worse around bright lights or loud noises.  · Other symptoms may include:  ? Feeling sick to your stomach (nauseous).  ? Vomiting.  ? Dizziness.  ? Being sensitive to bright lights, loud noises, or smells.  · Before you get a migraine headache, you may get warning signs (an aura). An aura may include:  ? Seeing flashing lights or having blind spots.  ? Seeing bright spots, halos, or zigzag lines.  ? Having tunnel vision or blurred vision.  ? Having numbness or a tingling feeling.  ? Having trouble talking.  ? Having weak muscles.  · Some people have symptoms after a migraine headache (postdromal phase), such as:  ? Tiredness.  ? Trouble  thinking (concentrating).  How is this treated?  · Taking medicines that:  ? Relieve pain.  ? Relieve the feeling of being sick to your stomach.  ? Prevent migraine headaches.  · Treatment may also include:  ? Having acupuncture.  ? Avoiding foods that bring on migraine headaches.  ? Learning ways to control your body functions (biofeedback).  ? Therapy to help you know and deal with negative thoughts (cognitive behavioral therapy).  Follow these instructions at home:  Medicines  · Take over-the-counter and prescription medicines only as told by your doctor.  · Ask your doctor if the medicine prescribed to you:  ? Requires you to avoid driving or using heavy machinery.  ? Can cause trouble pooping (constipation). You may need to take these steps to prevent or treat trouble pooping:  § Drink enough fluid to keep your pee (urine) pale yellow.  § Take over-the-counter or prescription medicines.  § Eat foods that are high in fiber. These include beans, whole grains, and fresh fruits and vegetables.  § Limit foods that are high in fat and sugar. These include fried or sweet foods.  Lifestyle  · Do not drink alcohol.  · Do not use any products that contain nicotine or tobacco, such as cigarettes, e-cigarettes, and chewing tobacco. If you need help quitting, ask your doctor.  · Get at least 8 hours of sleep every night.  · Limit and deal with stress.  General instructions         · Keep a journal to find out what may bring on your migraine headaches. For example, write down:  ? What you eat and drink.  ? How much sleep you get.  ? Any change in what you eat or drink.  ? Any change in your medicines.  · If you have a migraine headache:  ? Avoid things that make your symptoms worse, such as bright lights.  ? It may help to lie down in a dark, quiet room.  ? Do not drive or use heavy machinery.  ? Ask your doctor what activities are safe for you.  · Keep all follow-up visits as told by your doctor. This is important.  Contact  a doctor if:  · You get a migraine headache that is different or worse than others you have had.  · You have more than 15 headache days in one month.  Get help right away if:  · Your migraine headache gets very bad.  · Your migraine headache lasts longer than 72 hours.  · You have a fever.  · You have a stiff neck.  · You have trouble seeing.  · Your muscles feel weak or like you cannot control them.  · You start to lose your balance a lot.  · You start to have trouble walking.  · You pass out (faint).  · You have a seizure.  Summary  · A migraine headache is a very strong throbbing pain on one side or both sides of your head. These headaches can also cause other symptoms.  · This condition may be treated with medicines and changes to your lifestyle.  · Keep a journal to find out what may bring on your migraine headaches.  · Contact a doctor if you get a migraine headache that is different or worse than others you have had.  · Contact your doctor if you have more than 15 headache days in a month.  This information is not intended to replace advice given to you by your health care provider. Make sure you discuss any questions you have with your health care provider.  Document Revised: 04/10/2020 Document Reviewed: 01/30/2020  [employer] Patient Education © 2021 [employer]  Rimegepant oral dissolving tablet  What is this medicine?  RIMEGEPANT (ri ME je pant) is used to treat migraine headaches with or without aura. An aura is a strange feeling or visual disturbance that warns you of an attack. It is not used to prevent migraines.  This medicine may be used for other purposes; ask your health care provider or pharmacist if you have questions.  COMMON BRAND NAME(S): NURTEC ODT  What should I tell my health care provider before I take this medicine?  They need to know if you have any of these conditions:  · kidney disease  · liver disease  · an unusual or allergic reaction to rimegepant, other medicines, foods, dyes, or  preservatives  · pregnant or trying to get pregnant  · breast-feeding  How should I use this medicine?  Take the medicine by mouth. Follow the directions on the prescription label. Leave the tablet in the sealed blister pack until you are ready to take it. With dry hands, open the blister and gently remove the tablet. If the tablet breaks or crumbles, throw it away and take a new tablet out of the blister pack. Place the tablet in the mouth and allow it to dissolve, and then swallow. Do not cut, crush, or chew this medicine. You do not need water to take this medicine.  Talk to your pediatrician about the use of this medicine in children. Special care may be needed.  Overdosage: If you think you have taken too much of this medicine contact a poison control center or emergency room at once.  NOTE: This medicine is only for you. Do not share this medicine with others.  What if I miss a dose?  This does not apply. This medicine is not for regular use.  What may interact with this medicine?  This medicine may interact with the following medications:  · certain medicines for fungal infections like fluconazole, itraconazole  · rifampin  This list may not describe all possible interactions. Give your health care provider a list of all the medicines, herbs, non-prescription drugs, or dietary supplements you use. Also tell them if you smoke, drink alcohol, or use illegal drugs. Some items may interact with your medicine.  What should I watch for while using this medicine?  Visit your health care professional for regular checks on your progress. Tell your health care professional if your symptoms do not start to get better or if they get worse.  What side effects may I notice from receiving this medicine?  Side effects that you should report to your doctor or health care professional as soon as possible:  · allergic reactions like skin rash, itching or hives; swelling of the face, lips, or tongue  Side effects that usually do  not require medical attention (report these to your doctor or health care professional if they continue or are bothersome):  · nausea  This list may not describe all possible side effects. Call your doctor for medical advice about side effects. You may report side effects to FDA at 6-051-LBX-1729.  Where should I keep my medicine?  Keep out of the reach of children.  Store at room temperature between 15 and 30 degrees C (59 and 86 degrees F). Throw away any unused medicine after the expiration date.  NOTE: This sheet is a summary. It may not cover all possible information. If you have questions about this medicine, talk to your doctor, pharmacist, or health care provider.  © 2021 [employer]/Gold Standard (2020-03-02 00:21:31)

## 2021-08-26 ENCOUNTER — TELEPHONE (OUTPATIENT)
Dept: FAMILY MEDICINE CLINIC | Facility: CLINIC | Age: 26
End: 2021-08-26

## 2021-08-26 NOTE — TELEPHONE ENCOUNTER
Hub staff attempted to follow warm transfer process and was unsuccessful     Caller: Karma Morrissey    Relationship to patient: Self    Best call back number: [phone]    Patient is needing: PATIENT IS CALLING TO CHECK THE STATUS OF THE AUTHORIZATION FOR NURTEC SHE STATES THAT THE PHARMACY IS NOT ABLE TO FILL  THE MEDICATION

## 2021-09-03 ENCOUNTER — OFFICE VISIT (OUTPATIENT)
Dept: NEUROLOGY | Facility: CLINIC | Age: 26
End: 2021-09-03

## 2021-09-03 VITALS
OXYGEN SATURATION: 97 % | SYSTOLIC BLOOD PRESSURE: 118 MMHG | HEIGHT: 66 IN | DIASTOLIC BLOOD PRESSURE: 70 MMHG | BODY MASS INDEX: 47.09 KG/M2 | HEART RATE: 105 BPM | WEIGHT: 293 LBS | TEMPERATURE: 97.5 F

## 2021-09-03 DIAGNOSIS — E66.01 MORBIDLY OBESE (HCC): Chronic | ICD-10-CM

## 2021-09-03 DIAGNOSIS — G43.009 MIGRAINE WITHOUT AURA AND WITHOUT STATUS MIGRAINOSUS, NOT INTRACTABLE: Primary | ICD-10-CM

## 2021-09-03 PROCEDURE — 99214 OFFICE O/P EST MOD 30 MIN: CPT | Performed by: NURSE PRACTITIONER

## 2021-09-03 RX ORDER — AMITRIPTYLINE HYDROCHLORIDE 25 MG/1
25 TABLET, FILM COATED ORAL NIGHTLY
Qty: 30 TABLET | Refills: 1 | Status: SHIPPED | OUTPATIENT
Start: 2021-09-03 | End: 2021-11-01

## 2021-09-03 RX ORDER — RIZATRIPTAN BENZOATE 10 MG/1
10 TABLET, ORALLY DISINTEGRATING ORAL ONCE AS NEEDED
Qty: 9 TABLET | Refills: 2 | Status: SHIPPED | OUTPATIENT
Start: 2021-09-03 | End: 2021-10-26

## 2021-09-03 NOTE — PROGRESS NOTES
Neuro Office Visit      Encounter Date: 2021   Patient Name: Karma Morrissey  : 1995   MRN: [id number]   PCP: CHRISTIANO Pacheco  Chief Complaint:    Chief Complaint   Patient presents with   • Migraine       History of Present Illness: Karma Morrissey is a 25 y.o. female who is here today in Neurology for migraines.    Valiente's started age 11. HAs were severe, but never diagnosed with migraines. Thinks HA were due to uncorrected vision.    Current headaches started 4 years ago. Daily headaches. Frontal and occipital. No radiation of pain. Can start dull or sharp. Duration can be hours to days. Most severe HAs are frontal, retro-orbital area with  +photophoiba +phonophobia, occ blurred vision OU, spots in vision. +nausea -vomiting. -numbness and tingling. No whooshing sensation. Not relieved with supine position.    Admits to not eating on a regular basis. Has irregular sleep cycle. Is weaning her caffeine intake. Has gained 30 pounds since April with pregnancy that resulted in a miscarriage..     Nurtec from PCP was effective.    Abortives: IBU, Tylenol, Nurtec,  Preventatives:none    HCT 20 for dizziness was nml      Subjective      Past Medical History:   Past Medical History:   Diagnosis Date   • Anxiety    • Bipolar disorder (CMS/HCC)    • Depression        Past Surgical History:   Past Surgical History:   Procedure Laterality Date   • TONSILLECTOMY     • WISDOM TOOTH EXTRACTION         Family History:   Family History   Problem Relation Age of Onset   • Epilepsy Mother    • Depression Mother    • Bipolar disorder Father    • Depression Father    • Anxiety disorder Father    • Anxiety disorder Sister    • Bipolar disorder Sister    • Heart attack Maternal Grandfather    • No Known Problems Maternal Grandmother    • No Known Problems Paternal Grandmother    • Breast cancer Neg Hx    • Ovarian cancer Neg Hx    • Uterine cancer Neg Hx    • Endometrial cancer Neg Hx    • Colon cancer Neg Hx         Social History:   Social History     Socioeconomic History   • Marital status: Single     Spouse name: Not on file   • Number of children: Not on file   • Years of education: Not on file   • Highest education level: Not on file   Tobacco Use   • Smoking status: Never Smoker   • Smokeless tobacco: Never Used   Substance and Sexual Activity   • Alcohol use: Yes     Comment: ON OCCASION   • Drug use: No   • Sexual activity: Yes     Partners: Male     Birth control/protection: None       Medications:     Current Outpatient Medications:   •  escitalopram (LEXAPRO) 10 MG tablet, , Disp: , Rfl:   •  ibuprofen (ADVIL,MOTRIN) 800 MG tablet, Take 1 tablet by mouth Every 8 (Eight) Hours As Needed for Mild Pain ., Disp: 90 tablet, Rfl: 0  •  QUEtiapine (SEROquel) 50 MG tablet, Take 50 mg by mouth Every Night., Disp: , Rfl:   •  amitriptyline (ELAVIL) 25 MG tablet, Take 1 tablet by mouth Every Night., Disp: 30 tablet, Rfl: 1  •  rizatriptan MLT (Maxalt-MLT) 10 MG disintegrating tablet, Place 1 tablet on the tongue 1 (One) Time As Needed for Migraine for up to 1 dose. May repeat in 2 hours if needed, Disp: 9 tablet, Rfl: 2    Allergies:   No Known Allergies      Objective     Physical Exam:   Physical Exam  Eyes:      Pupils: Pupils are equal, round, and reactive to light.   Neurological:      Mental Status: She is oriented to person, place, and time.      Coordination: Finger-Nose-Finger Test, Heel to Shin Test and Romberg Test normal.      Gait: Gait is intact.      Deep Tendon Reflexes:      Reflex Scores:       Tricep reflexes are 2+ on the right side and 2+ on the left side.       Bicep reflexes are 2+ on the right side and 2+ on the left side.       Brachioradialis reflexes are 2+ on the right side and 2+ on the left side.       Patellar reflexes are 2+ on the right side and 2+ on the left side.       Achilles reflexes are 2+ on the right side and 2+ on the left side.  Psychiatric:         Speech: Speech normal.  "        Neurologic Exam     Mental Status   Oriented to person, place, and time.   Follows 3 step commands.   Attention: normal. Concentration: normal.   Speech: speech is normal   Level of consciousness: alert  Knowledge: consistent with education.   Normal comprehension.     Cranial Nerves     CN III, IV, VI   Pupils are equal, round, and reactive to light.  Right pupil: Accommodation: intact.   Left pupil: Accommodation: intact.   CN III: no CN III palsy  CN VI: no CN VI palsy  Nystagmus: none   Diplopia: none  Upgaze: normal  Downgaze: normal  Conjugate gaze: present    CN VII   Facial expression full, symmetric.     CN VIII   Hearing: intact    CN XII   CN XII normal.     Motor Exam   Muscle bulk: normal  Overall muscle tone: normal    Strength   Right biceps: 5/5  Left biceps: 5/5  Right triceps: 5/5  Left triceps: 5/5  Right interossei: 5/5  Left interossei: 5/5  Right quadriceps: 5/5  Left quadriceps: 5/5  Right anterior tibial: 5/5  Left anterior tibial: 5/5  Right posterior tibial: 5/5  Left posterior tibial: 5/5    Sensory Exam   Light touch normal.     Gait, Coordination, and Reflexes     Gait  Gait: normal    Coordination   Romberg: negative  Finger to nose coordination: normal  Heel to shin coordination: normal    Tremor   Resting tremor: absent  Action tremor: absent    Reflexes   Right brachioradialis: 2+  Left brachioradialis: 2+  Right biceps: 2+  Left biceps: 2+  Right triceps: 2+  Left triceps: 2+  Right patellar: 2+  Left patellar: 2+  Right achilles: 2+  Left achilles: 2+  Right : 2+  Left : 2+       Vital Signs:   Vitals:    09/03/21 1048   BP: 118/70   Pulse: 105   Temp: 97.5 °F (36.4 °C)   TempSrc: Temporal   SpO2: 97%   Weight: (!) 150 kg (331 lb 3.2 oz)   Height: 167.6 cm (65.98\")     Body mass index is 53.48 kg/m².     Results:   Imaging:   No Images in the past 120 days found..       Assessment / Plan      Assessment/Plan:   Diagnoses and all orders for this visit:    1. Migraine " without aura and without status migrainosus, not intractable (Primary)  -     MRI Brain With & Without Contrast; Future  -     rizatriptan MLT (Maxalt-MLT) 10 MG disintegrating tablet; Place 1 tablet on the tongue 1 (One) Time As Needed for Migraine for up to 1 dose. May repeat in 2 hours if needed  Dispense: 9 tablet; Refill: 2  -     amitriptyline (ELAVIL) 25 MG tablet; Take 1 tablet by mouth Every Night.  Dispense: 30 tablet; Refill: 1    2. Morbidly obese (CMS/HCC)           Patient Education:   Discussed the importance of sleep hygiene, regular meals and staying hydrated.  Cont to slowly wean caffeine.    Reviewed medications, potential side effects and signs and symptoms to report. Discussed risk versus benefits of treatment plan with patient and/or family-including medications, labs and radiology that may be ordered. Addressed questions and concerns during visit. Patient and/or family verbalized understanding and agree with plan. Instructed to call the office with any questions and report to ER with any life-threatening symptoms.  Follow Up:   Return in about 8 weeks (around 10/29/2021) for Recheck.      During this visit the following were done:  Labs Reviewed [x]    Labs Ordered []    Radiology Reports Reviewed [x]    Radiology Ordered [x]    PCP Records Reviewed [x]    Referring Provider Records Reviewed []    ER Records Reviewed []    Hospital Records Reviewed []    History Obtained From Family []    Radiology Images Reviewed []    Other Reviewed []     Records Requested []      Gisselle Mccarthy, UNIQUE, APRN

## 2021-09-09 ENCOUNTER — PRIOR AUTHORIZATION (OUTPATIENT)
Dept: FAMILY MEDICINE CLINIC | Facility: CLINIC | Age: 26
End: 2021-09-09

## 2021-09-15 ENCOUNTER — TELEPHONE (OUTPATIENT)
Dept: OBSTETRICS AND GYNECOLOGY | Facility: CLINIC | Age: 26
End: 2021-09-15

## 2021-09-15 NOTE — TELEPHONE ENCOUNTER
Patient called and stated that the motrin and Maxalt is working very well for her and she would like to continue taking those.  She wanted to know if refills could be sent to her pharmacy.  Please give her a call at [phone]

## 2021-09-17 ENCOUNTER — OFFICE VISIT (OUTPATIENT)
Dept: FAMILY MEDICINE CLINIC | Facility: CLINIC | Age: 26
End: 2021-09-17

## 2021-09-17 VITALS
BODY MASS INDEX: 45.99 KG/M2 | TEMPERATURE: 97.3 F | SYSTOLIC BLOOD PRESSURE: 124 MMHG | HEART RATE: 97 BPM | WEIGHT: 293 LBS | HEIGHT: 67 IN | OXYGEN SATURATION: 98 % | DIASTOLIC BLOOD PRESSURE: 82 MMHG

## 2021-09-17 DIAGNOSIS — Z79.899 ENCOUNTER FOR LONG-TERM CURRENT USE OF MEDICATION: ICD-10-CM

## 2021-09-17 DIAGNOSIS — E66.01 MORBID OBESITY WITH BMI OF 50.0-59.9, ADULT (HCC): Primary | ICD-10-CM

## 2021-09-17 PROCEDURE — 99214 OFFICE O/P EST MOD 30 MIN: CPT | Performed by: NURSE PRACTITIONER

## 2021-09-18 ENCOUNTER — LAB (OUTPATIENT)
Dept: FAMILY MEDICINE CLINIC | Facility: CLINIC | Age: 26
End: 2021-09-18

## 2021-09-18 DIAGNOSIS — O03.9 MISCARRIAGE: ICD-10-CM

## 2021-09-18 PROCEDURE — 83036 HEMOGLOBIN GLYCOSYLATED A1C: CPT | Performed by: NURSE PRACTITIONER

## 2021-09-18 PROCEDURE — 84439 ASSAY OF FREE THYROXINE: CPT | Performed by: NURSE PRACTITIONER

## 2021-09-18 PROCEDURE — 85025 COMPLETE CBC W/AUTO DIFF WBC: CPT | Performed by: NURSE PRACTITIONER

## 2021-09-18 PROCEDURE — 84443 ASSAY THYROID STIM HORMONE: CPT | Performed by: NURSE PRACTITIONER

## 2021-09-18 PROCEDURE — 80053 COMPREHEN METABOLIC PANEL: CPT | Performed by: NURSE PRACTITIONER

## 2021-09-19 LAB
ALBUMIN SERPL-MCNC: 4.4 G/DL (ref 3.5–5.2)
ALBUMIN/GLOB SERPL: 1.5 G/DL
ALP SERPL-CCNC: 106 U/L (ref 39–117)
ALT SERPL W P-5'-P-CCNC: 33 U/L (ref 1–33)
ANION GAP SERPL CALCULATED.3IONS-SCNC: 13.5 MMOL/L (ref 5–15)
AST SERPL-CCNC: 33 U/L (ref 1–32)
BASOPHILS # BLD AUTO: 0.04 10*3/MM3 (ref 0–0.2)
BASOPHILS NFR BLD AUTO: 0.4 % (ref 0–1.5)
BILIRUB SERPL-MCNC: 0.3 MG/DL (ref 0–1.2)
BUN SERPL-MCNC: 8 MG/DL (ref 6–20)
BUN/CREAT SERPL: 7.4 (ref 7–25)
CALCIUM SPEC-SCNC: 9.4 MG/DL (ref 8.6–10.5)
CHLORIDE SERPL-SCNC: 104 MMOL/L (ref 98–107)
CO2 SERPL-SCNC: 21.5 MMOL/L (ref 22–29)
CREAT SERPL-MCNC: 1.08 MG/DL (ref 0.57–1)
DEPRECATED RDW RBC AUTO: 36.5 FL (ref 37–54)
EOSINOPHIL # BLD AUTO: 0.23 10*3/MM3 (ref 0–0.4)
EOSINOPHIL NFR BLD AUTO: 2.4 % (ref 0.3–6.2)
ERYTHROCYTE [DISTWIDTH] IN BLOOD BY AUTOMATED COUNT: 12.2 % (ref 12.3–15.4)
GFR SERPL CREATININE-BSD FRML MDRD: 61 ML/MIN/1.73
GLOBULIN UR ELPH-MCNC: 2.9 GM/DL
GLUCOSE SERPL-MCNC: 96 MG/DL (ref 65–99)
HBA1C MFR BLD: 5.1 % (ref 4.8–5.6)
HCT VFR BLD AUTO: 38.6 % (ref 34–46.6)
HGB BLD-MCNC: 12.7 G/DL (ref 12–15.9)
IMM GRANULOCYTES # BLD AUTO: 0.04 10*3/MM3 (ref 0–0.05)
IMM GRANULOCYTES NFR BLD AUTO: 0.4 % (ref 0–0.5)
LYMPHOCYTES # BLD AUTO: 1.76 10*3/MM3 (ref 0.7–3.1)
LYMPHOCYTES NFR BLD AUTO: 18.6 % (ref 19.6–45.3)
MCH RBC QN AUTO: 27.7 PG (ref 26.6–33)
MCHC RBC AUTO-ENTMCNC: 32.9 G/DL (ref 31.5–35.7)
MCV RBC AUTO: 84.3 FL (ref 79–97)
MONOCYTES # BLD AUTO: 0.48 10*3/MM3 (ref 0.1–0.9)
MONOCYTES NFR BLD AUTO: 5.1 % (ref 5–12)
NEUTROPHILS NFR BLD AUTO: 6.92 10*3/MM3 (ref 1.7–7)
NEUTROPHILS NFR BLD AUTO: 73.1 % (ref 42.7–76)
NRBC BLD AUTO-RTO: 0 /100 WBC (ref 0–0.2)
PLATELET # BLD AUTO: 279 10*3/MM3 (ref 140–450)
PMV BLD AUTO: 11 FL (ref 6–12)
POTASSIUM SERPL-SCNC: 4 MMOL/L (ref 3.5–5.2)
PROT SERPL-MCNC: 7.3 G/DL (ref 6–8.5)
RBC # BLD AUTO: 4.58 10*6/MM3 (ref 3.77–5.28)
SODIUM SERPL-SCNC: 139 MMOL/L (ref 136–145)
T4 FREE SERPL-MCNC: 1.05 NG/DL (ref 0.93–1.7)
TSH SERPL DL<=0.05 MIU/L-ACNC: 1.63 UIU/ML (ref 0.27–4.2)
WBC # BLD AUTO: 9.47 10*3/MM3 (ref 3.4–10.8)

## 2021-09-19 NOTE — ASSESSMENT & PLAN NOTE
Patient's (Body mass index is 52.97 kg/m².) indicates that she is morbidly obese.. Weight is worsening. BMI is is above average; BMI management plan is completed. We discussed low calorie, low carb based diet program, portion control, increasing exercise, consulting a Bariatric surgeon and management of depression/anxiety/stress to control compensatory eating.

## 2021-09-19 NOTE — PROGRESS NOTES
Follow Up Office Note     Patient Name: Karma Morrissey  : 1995   MRN: [id number]     Chief Complaint:    Chief Complaint   Patient presents with   • Weight Loss     possible lap ban ect       History of Present Illness: Karma Morrissey is a 25 y.o. female who presents today to discuss weight management. She has a long history of obesity. She has tried diet and exercise in the past without success. She states that she is continuing to gain weight.  She is considering possible bariatric surgery.   Patient has been on long-term therapy with Seroquel for management of bipolar disorder. She reports that her symptoms have been stable and controlled on this medication.    Obesity  This is a chronic problem. The current episode started more than 1 year ago. The problem occurs daily. The problem has been gradually worsening. Pertinent negatives include no abdominal pain or change in bowel habit. Nothing aggravates the symptoms.        Subjective      Review of Systems:   Review of Systems   Constitutional: Negative.    Respiratory: Negative.    Cardiovascular: Negative.    Gastrointestinal: Negative.  Negative for abdominal pain and change in bowel habit.        Past Medical History:   Past Medical History:   Diagnosis Date   • Anxiety    • Bipolar disorder (CMS/[facility])    • Depression          Medications:     Current Outpatient Medications:   •  amitriptyline (ELAVIL) 25 MG tablet, Take 1 tablet by mouth Every Night., Disp: 30 tablet, Rfl: 1  •  escitalopram (LEXAPRO) 10 MG tablet, , Disp: , Rfl:   •  ibuprofen (ADVIL,MOTRIN) 800 MG tablet, Take 1 tablet by mouth Every 8 (Eight) Hours As Needed for Mild Pain ., Disp: 90 tablet, Rfl: 0  •  levocetirizine (Xyzal) 5 MG tablet, Take 1 tablet by mouth Every Evening for 30 days., Disp: 30 tablet, Rfl: 6  •  QUEtiapine (SEROquel) 50 MG tablet, Take 50 mg by mouth Every Night., Disp: , Rfl:   •  rizatriptan MLT (Maxalt-MLT) 10 MG disintegrating tablet, Place 1 tablet on  "the tongue 1 (One) Time As Needed for Migraine for up to 1 dose. May repeat in 2 hours if needed, Disp: 9 tablet, Rfl: 2    Allergies:   No Known Allergies      Objective     Physical Exam:  Vital Signs:   Vitals:    09/17/21 1311   BP: 124/82   Pulse: 97   Temp: 97.3 °F (36.3 °C)   SpO2: 98%   Weight: (!) 151 kg (333 lb 3.2 oz)   Height: 168.9 cm (66.5\")     Body mass index is 52.97 kg/m².     Physical Exam  Vitals and nursing note reviewed.   Constitutional:       General: She is not in acute distress.     Appearance: Normal appearance. She is well-developed. She is not ill-appearing, toxic-appearing or diaphoretic.   HENT:      Head: Normocephalic and atraumatic.   Cardiovascular:      Rate and Rhythm: Normal rate and regular rhythm.      Heart sounds: Normal heart sounds.   Pulmonary:      Effort: Pulmonary effort is normal. No respiratory distress.      Breath sounds: Normal breath sounds. No stridor. No wheezing.   Skin:     General: Skin is warm and dry.   Neurological:      General: No focal deficit present.      Mental Status: She is alert and oriented to person, place, and time.   Psychiatric:         Mood and Affect: Mood normal.         Behavior: Behavior normal. Behavior is cooperative.         Thought Content: Thought content normal.         Judgment: Judgment normal.         Assessment / Plan      Assessment/Plan:   Diagnoses and all orders for this visit:    1. Morbid (severe) obesity due to excess calories (CMS/[facility]) (Primary)  -     Ambulatory Referral to Nutrition Services  -     Hemoglobin A1c  -     TSH  -     T4, Free  -     Comprehensive Metabolic Panel  -     CBC Auto Differential  -     Ambulatory Referral to Bariatric Surgery    Current Assessment and Plan  -Patient's (Body mass index is 52.97 kg/m².) indicates that she is morbidly obese.. Weight is worsening. BMI is is above average; BMI management plan is completed. We discussed low calorie, low carb based diet program, portion control, " increasing exercise, consulting a Bariatric surgeon and management of depression/anxiety/stress to control compensatory eating.     2. Encounter for long-term current use of medication  -     Comprehensive Metabolic Panel  -     CBC Auto Differential       Follow Up:   PRN and at next scheduled appointment(s) with PCP.    Discussed the nature of the medical condition(s) risks, complications, implications, management, safe and proper use of medications. Encouraged medication compliance, and keeping scheduled follow up appointments with me and any other providers.      Laboratory testing ordered today. Further recommendations after lab evaluation.    RTC if symptoms fail to improve, to ER if symptoms worsen.      CHRISTIANO Pacheco  [facility]       Please note that portions of this note may have been completed with a voice recognition program. Efforts were made to edit the dictations, but occasionally words are mistranscribed.

## 2021-09-19 NOTE — PATIENT INSTRUCTIONS
Obesity, Adult  Obesity is the condition of having too much total body fat. Being overweight or obese means that your weight is greater than what is considered healthy for your body size. Obesity is determined by a measurement called BMI. BMI is an estimate of body fat and is calculated from height and weight. For adults, a BMI of 30 or higher is considered obese.  Obesity can lead to other health concerns and major illnesses, including:  · Stroke.  · Coronary artery disease (CAD).  · Type 2 diabetes.  · Some types of cancer, including cancers of the colon, breast, uterus, and gallbladder.  · Osteoarthritis.  · High blood pressure (hypertension).  · High cholesterol.  · Sleep apnea.  · Gallbladder stones.  · Infertility problems.  What are the causes?  Common causes of this condition include:  · Eating daily meals that are high in calories, sugar, and fat.  · Being born with genes that may make you more likely to become obese.  · Having a medical condition that causes obesity, including:  ? Hypothyroidism.  ? Polycystic ovarian syndrome (PCOS).  ? Binge-eating disorder.  ? Cushing syndrome.  · Taking certain medicines, such as steroids, antidepressants, and seizure medicines.  · Not being physically active (sedentary lifestyle).  · Not getting enough sleep.  · Drinking high amounts of sugar-sweetened beverages, such as soft drinks.  What increases the risk?  The following factors may make you more likely to develop this condition:  · Having a family history of obesity.  · Being a woman of  descent.  · Being a man of  descent.  · Living in an area with limited access to:  ? [place], recreation centers, or sidewalks.  ? Healthy food choices, such as grocery stores and farmers' markets.  What are the signs or symptoms?  The main sign of this condition is having too much body fat.  How is this diagnosed?  This condition is diagnosed based on:  · Your BMI. If you are an adult with a BMI of 30 or  higher, you are considered obese.  · Your waist circumference. This measures the distance around your waistline.  · Your skinfold thickness. Your health care provider may gently pinch a fold of your skin and measure it.  You may have other tests to check for underlying conditions.  How is this treated?  Treatment for this condition often includes changing your lifestyle. Treatment may include some or all of the following:  · Dietary changes. This may include developing a healthy meal plan.  · Regular physical activity. This may include activity that causes your heart to beat faster (aerobic exercise) and strength training. Work with your health care provider to design an exercise program that works for you.  · Medicine to help you lose weight if you are unable to lose 1 pound a week after 6 weeks of healthy eating and more physical activity.  · Treating conditions that cause the obesity (underlying conditions).  · Surgery. Surgical options may include gastric banding and gastric bypass. Surgery may be done if:  ? Other treatments have not helped to improve your condition.  ? You have a BMI of 40 or higher.  ? You have life-threatening health problems related to obesity.  Follow these instructions at home:  Eating and drinking    · Follow recommendations from your health care provider about what you eat and drink. Your health care provider may advise you to:  ? Limit fast food, sweets, and processed snack foods.  ? Choose low-fat options, such as low-fat milk instead of whole milk.  ? Eat 5 or more servings of fruits or vegetables every day.  ? Eat at home more often. This gives you more control over what you eat.  ? Choose healthy foods when you eat out.  ? Learn to read food labels. This will help you understand how much food is considered 1 serving.  ? Learn what a healthy serving size is.  ? Keep low-fat snacks available.  ? Limit sugary drinks, such as soda, fruit juice, sweetened iced tea, and flavored  milk.  · Drink enough water to keep your urine pale yellow.  · Do not follow a fad diet. Fad diets can be unhealthy and even dangerous.    Physical activity  · Exercise regularly, as told by your health care provider.  ? Most adults should get up to 150 minutes of moderate-intensity exercise every week.  ? Ask your health care provider what types of exercise are safe for you and how often you should exercise.  · Warm up and stretch before being active.  · Cool down and stretch after being active.  · Rest between periods of activity.  Lifestyle  · Work with your health care provider and a dietitian to set a weight-loss goal that is healthy and reasonable for you.  · Limit your screen time.  · Find ways to reward yourself that do not involve food.  · Do not drink alcohol if:  ? Your health care provider tells you not to drink.  ? You are pregnant, may be pregnant, or are planning to become pregnant.  · If you drink alcohol:  ? Limit how much you use to:  § 0-1 drink a day for women.  § 0-2 drinks a day for men.  ? Be aware of how much alcohol is in your drink. In the [place] one drink equals one 12 oz bottle of beer (355 mL), one 5 oz glass of wine (148 mL), or one 1½ oz glass of hard liquor (44 mL).  General instructions  · Keep a weight-loss journal to keep track of the food you eat and how much exercise you get.  · Take over-the-counter and prescription medicines only as told by your health care provider.  · Take vitamins and supplements only as told by your health care provider.  · Consider joining a support group. Your health care provider may be able to recommend a support group.  · Keep all follow-up visits as told by your health care provider. This is important.  Contact a health care provider if:  · You are unable to meet your weight loss goal after 6 weeks of dietary and lifestyle changes.  Get help right away if you are having:  · Trouble breathing.  · Suicidal thoughts or behaviors.  Summary  · Obesity is  the condition of having too much total body fat.  · Being overweight or obese means that your weight is greater than what is considered healthy for your body size.  · Work with your health care provider and a dietitian to set a weight-loss goal that is healthy and reasonable for you.  · Exercise regularly, as told by your health care provider. Ask your health care provider what types of exercise are safe for you and how often you should exercise.  This information is not intended to replace advice given to you by your health care provider. Make sure you discuss any questions you have with your health care provider.  Document Revised: 08/22/2019 Document Reviewed: 08/22/2019  [employer] Patient Education © 2021 [employer]    BMI for Adults  What is BMI?  Body mass index (BMI) is a number that is calculated from a person's weight and height. BMI can help estimate how much of a person's weight is composed of fat. BMI does not measure body fat directly. Rather, it is an alternative to procedures that directly measure body fat, which can be difficult and expensive.  BMI can help identify people who may be at higher risk for certain medical problems.  What are BMI measurements used for?  BMI is used as a screening tool to identify possible weight problems. It helps determine whether a person is obese, overweight, a healthy weight, or underweight.  BMI is useful for:  · Identifying a weight problem that may be related to a medical condition or may increase the risk for medical problems.  · Promoting changes, such as changes in diet and exercise, to help reach a healthy weight. BMI screening can be repeated to see if these changes are working.  How is BMI calculated?  BMI involves measuring your weight in relation to your height. Both height and weight are measured, and the BMI is calculated from those numbers. This can be done either in English ([place] or metric measurements. Note that charts and online BMI calculators are  "available to help you find your BMI quickly and easily without having to do these calculations yourself.  To calculate your BMI in English ([place] measurements:    1. Measure your weight in pounds (lb).  2. Multiply the number of pounds by 703.  ? For example, for a person who weighs 180 lb, multiply that number by 703, which equals 126,540.  3. Measure your height in inches. Then multiply that number by itself to get a measurement called \"inches squared.\"  ? For example, for a person who is 70 inches tall, the \"inches squared\" measurement is 70 inches x 70 inches, which equals 4,900 inches squared.  4. Divide the total from step 2 (number of lb x 703) by the total from step 3 (inches squared): 126,540 ÷ 4,900 = 25.8. This is your BMI.    To calculate your BMI in metric measurements:  1. Measure your weight in kilograms (kg).  2. Measure your height in meters (m). Then multiply that number by itself to get a measurement called \"meters squared.\"  ? For example, for a person who is 1.75 m tall, the \"meters squared\" measurement is 1.75 m x 1.75 m, which is equal to 3.1 meters squared.  3. Divide the number of kilograms (your weight) by the meters squared number. In this example: 70 ÷ 3.1 = 22.6. This is your BMI.  What do the results mean?  BMI charts are used to identify whether you are underweight, normal weight, overweight, or obese. The following guidelines will be used:  · Underweight: BMI less than 18.5.  · Normal weight: BMI between 18.5 and 24.9.  · Overweight: BMI between 25 and 29.9.  · Obese: BMI of 30 or above.  Keep these notes in mind:  · Weight includes both fat and muscle, so someone with a muscular build, such as an athlete, may have a BMI that is higher than 24.9. In cases like these, BMI is not an accurate measure of body fat.  · To determine if excess body fat is the cause of a BMI of 25 or higher, further assessments may need to be done by a health care provider.  · BMI is usually interpreted in the " same way for men and women.  Where to find more information  For more information about BMI, including tools to quickly calculate your BMI, go to these websites:  · Centers for Disease Control and Prevention: www.cdc.gov  · American Heart Association: www.heart.org  · National Heart, Lung, and Blood [place]: www.nhlbi.nih.gov  Summary  · Body mass index (BMI) is a number that is calculated from a person's weight and height.  · BMI may help estimate how much of a person's weight is composed of fat. BMI can help identify those who may be at higher risk for certain medical problems.  · BMI can be measured using English measurements or metric measurements.  · BMI charts are used to identify whether you are underweight, normal weight, overweight, or obese.  This information is not intended to replace advice given to you by your health care provider. Make sure you discuss any questions you have with your health care provider.  Document Revised: 09/09/2020 Document Reviewed: 07/17/2020  [employer] Patient Education © 2021 [employer] Inc.    Bariatric Surgery Information  Bariatric surgery, also called weight loss surgery, is a procedure that helps you lose weight. You may consider, or your health care provider may suggest, bariatric surgery if:  · You are severely obese and have been unable to lose weight through diet and exercise.  · You have health problems related to obesity, such as:  ? Type 2 diabetes.  ? Heart disease.  ? Lung disease.  How does bariatric surgery help me lose weight?  Bariatric surgery helps you lose weight by:  · Decreasing how much food your body absorbs. This is done by closing off part of your stomach to make it smaller. This restricts the amount of food your stomach can hold.  · Changing your body's regular digestive process so that food bypasses the parts of your body that absorb calories and nutrients.  If you decide to have bariatric surgery, it is important to continue to eat a healthy diet  and exercise regularly after the surgery.  What are the different kinds of bariatric surgery?  There are two kinds of bariatric surgeries:  · Restrictive surgery. This procedure makes your stomach smaller. It does not change your digestive process. The smaller the size of your new stomach, the less food you can eat. There are different types of restrictive surgeries.  · Malabsorptive surgery. This procedure makes your stomach smaller and alters your digestive process so that your body processes less calories and nutrients. These are the most common kind of bariatric surgery. There are different types of malabsorptive surgeries.  What are the different types of restrictive surgery?  Adjustable Gastric Banding  In this procedure, an inflatable band is placed around your stomach near the upper end. This makes the passageway for food into the rest of your stomach much smaller. The band can be adjusted, making it tighter or looser, by filling it with salt solution. Your surgeon can adjust the band based on how you are feeling and how much weight you are losing. The band can be removed in the future. This requires another surgery.  Sleeve Gastrectomy  In this procedure, your stomach is made smaller. This is done by surgically removing a large part of your stomach. When your stomach is smaller, you feel full more quickly and reduce how much you eat.  What are the different types of malabsorptive surgery?    Lydia-en-Y Gastric Bypass (RGB)  This is the most common weight loss surgery. In this procedure, a small stomach pouch (gastric pouch) is created in the upper part of your stomach. Next, this gastric pouch is attached directly to the middle part of your small intestine. The farther down your small intestine the new connection is made, the fewer calories and nutrients you will absorb. This surgery has the highest rate of complications.  Biliopancreatic Diversion with Duodenal Switch (BPD/DS)  This is a multi-step  procedure. First, a large part of your stomach is removed, making your stomach smaller. Next, this smaller stomach is attached to the lower part of your small intestine. Like the RGB surgery, you absorb fewer calories and nutrients the farther down your small intestine the attachment is made.  What are the risks of bariatric surgery?  As with any surgical procedure, each type of bariatric surgery has its own risks. These risks also depend on your age, your overall health, and any other medical conditions you may have. When deciding on bariatric surgery, it is very important to:  · Talk to your health care provider and choose the surgery that is best for you.  · Ask your health care provider about specific risks for the surgery you choose.  Generally, the risks of bariatric surgery include:  · Infection.  · Bleeding.  · Not getting enough nutrients from food (nutritional deficiencies).  · Failure of the device or procedure. This may require another surgery to correct the problem.  Where to find more information  · American Society for Metabolic & Bariatric Surgery: www.asmbs.org  · Weight-control Information Network (WIN): win.niddk.nih.gov  Summary  · Bariatric surgery, also called weight loss surgery, is a procedure that helps you lose weight.  · This surgery may be recommended if you have diabetes, heart disease, or lung disease.  · Generally, risks of bariatric surgery include infection, bleeding, and failure of the surgery or device, which may require another surgery to correct the problem.  This information is not intended to replace advice given to you by your health care provider. Make sure you discuss any questions you have with your health care provider.  Document Revised: 04/22/2021 Document Reviewed: 04/22/2021  Elsevier Patient Education © 2021 Elsevier Inc.    Sleeve Gastrectomy  A sleeve gastrectomy is a surgery in which a large portion of the stomach (about 80%) is removed. After the surgery, the size  of the stomach is reduced to a narrow tube about the size of a banana. The reduced size of the stomach limits the amount of food that you can eat, which helps you lose weight.  Tell a health care provider about:  · Any allergies you have.  · All medicines you are taking, including vitamins, herbs, eye drops, creams, and over-the-counter medicines.  · Any problems you or family members have had with anesthetic medicines.  · Any blood disorders you have.  · Any surgeries you have had.  · Any medical conditions you have.  · Whether you are pregnant or may be pregnant.  What are the risks?  Generally, this is a safe procedure. However, problems may occur, including:  · Infection.  · Bleeding.  · Allergic reactions to medicines.  · Damage to other structures or organs.  · Blood clots.  What happens before the procedure?  Staying hydrated  Follow instructions from your health care provider about hydration, which may include:  · Up to 2 hours before the procedure - you may continue to drink clear liquids, such as water, clear fruit juice, black coffee, and plain tea.    Eating and drinking restrictions  Follow instructions from your health care provider about eating and drinking, which may include:  · 8 hours before the procedure - stop eating heavy meals or foods, such as meat, fried foods, or fatty foods.  · 6 hours before the procedure - stop eating light meals or foods, such as toast or cereal.  · 6 hours before the procedure - stop drinking milk or drinks that contain milk.  · 2 hours before the procedure - stop drinking clear liquids.  Medicines  Ask your health care provider about:  · Changing or stopping your regular medicines. This is especially important if you are taking diabetes medicines or blood thinners.  · Taking medicines such as aspirin and ibuprofen. These medicines can thin your blood. Do not take these medicines unless your health care provider tells you to take them.  · Taking over-the-counter  medicines, vitamins, herbs, and supplements.  Tests  You may have an exam or testing. This may include:  · Blood tests.  · Urine tests.  · Stool tests.  · X-rays.  · Ultrasound.  · Esophageal manometry. This test checks the tube that carries food from your mouth to your stomach (esophagus).  General instructions  · Plan to have someone take you home from the hospital or clinic.  · If you will be going home right after the procedure, plan to have someone with you for 24 hours.  · Ask your health care provider how your surgical site will be marked or identified.  · Ask your health care provider what steps will be taken to help prevent infection. These may include:  ? Removing hair at the surgery site.  ? Washing skin with a germ-killing soap.  ? Taking antibiotic medicine.  What happens during the procedure?    · An IV will be inserted into one of your veins.  · You may be given one or both of the following:  ? A medicine to help you relax (sedative).  ? A medicine to make you fall asleep (general anesthetic).  · Several small incisions will be made in your abdomen.  · A thin, lighted tube with a tiny camera (laparoscope) will be inserted into one of the incisions. The camera sends pictures to a TV screen in the operating room. This gives the surgeon a good view of the stomach.  · Small surgical instruments will be put through the other incisions.  · Part of your stomach will be cut and removed through one of the incisions.  · The remaining part of your stomach will be closed using stitches (sutures).  · A small tube (drain) may be placed through one of the incisions to allow extra fluid to flow from the area.  · Your incisions may be closed with sutures, staples, or skin glue.  · Your incisions may be covered with a bandage (dressing).  The procedure may vary among health care providers and hospitals.  What happens after the procedure?  · Your blood pressure, heart rate, breathing rate, and blood oxygen level will be  monitored until you leave the hospital or clinic.  · You will continue to receive fluids and medicines through an IV line.  · You may have some pain and nausea. You will be given medicines to treat this.  · You may have fluid leaking from a drain in one of your incisions. This is normal.  · You may have to wear compression stockings. These stockings help to prevent blood clots and reduce swelling in your legs.  · You will be encouraged to walk around several times a day. This helps to prevent blood clots.  · You will be started on a liquid diet the first day after your procedure. You may have some tests to check if you are ready to start the liquid diet.  · You will be encouraged to cough and to perform deep-breathing exercises. This helps to prevent a lung infection.  · Do not drive for 24 hours if you were given a sedative during your procedure.  Summary  · A sleeve gastrectomy is a surgery in which a large portion of the stomach (about 80%) is removed.  · The reduced size of the stomach limits the amount of food that you can eat, which helps you lose weight.  · Follow your health care provider's instructions on when to stop eating and drinking and what medicines to change or stop before the procedure.  · Your health care provider will make several small incisions in your abdomen. He or she will cut part of your stomach and remove it through one of the small incisions.  · After the procedure, you will be given medicines to treat pain and nausea. Do not drive for 24 hours if you were given a sedative during your procedure.  This information is not intended to replace advice given to you by your health care provider. Make sure you discuss any questions you have with your health care provider.  Document Revised: 04/09/2020 Document Reviewed: 08/06/2019  Elsevier Patient Education © 2021 Else[employer] Inc.  Why Obesity Matters  Obesity is a condition common in both adults and children that can significantly impact overall  health. You will learn how this medical condition develops, and the impact on your health.  To view the content, go to this web address:  [email or web address]    This video will  on: 2023. If you need access to this video following this date, please reach out to the healthcare provider who assigned it to you.  This information is not intended to replace advice given to you by your health care provider. Make sure you discuss any questions you have with your health care provider.  [employer]’s editorial and clinical teams regularly review and update content to ensure it is up-to-date with changing practice standards and recognized medical guidelines.  Document Revised: 2021  [employer] Patient Education ©  [employer].  Nonsurgical Weight Loss Treatment Options  Nonsurgical weight loss has many different components. You will learn how to work with a specialized care team to incorporate diet, exercise, and lifestyle changes into your life.  To view the content, go to this web address:  [email or web address]    This video will  on: 2023. If you need access to this video following this date, please reach out to the healthcare provider who assigned it to you.  This information is not intended to replace advice given to you by your health care provider. Make sure you discuss any questions you have with your health care provider.  [employer]’s editorial and clinical teams regularly review and update content to ensure it is up-to-date with changing practice standards and recognized medical guidelines.  Document Revised: 2021  [employer] Patient Education 2021 [employer]    Managing Bipolar Disorder  When someone is diagnosed with bipolar disorder, the person may be relieved to now know why he or she has felt or behaved a certain way. The person may also feel overwhelmed about the treatment ahead, how to get needed support, and how to deal with the condition each day.  With care and support, a person with bipolar disorder can learn to manage his or her symptoms and live with the condition.  How to manage lifestyle changes  Managing stress  Stress is your body's reaction to life changes and events, both good and bad. Stress can play a major role in bipolar disorder, so it is important to learn how to manage stress. Some techniques to help you manage stress include:  · Meditation, muscle relaxation, and breathing exercises.  · Exercise. Even a short daily walk can help to lower stress levels.  · Getting enough good-quality sleep. Too little sleep can cause gail to start (can trigger gail).  · Making a schedule to manage your time. Knowing your daily schedule can help to keep you from feeling overwhelmed by tasks and deadlines.  · Spending time on hobbies you enjoy.    Medicines  Your health care provider may suggest certain medicines if he or she feels that they will help improve your condition. Avoid using caffeine, alcohol, and other substances that may prevent your medicines from working properly. It is also important to:  · Talk with your pharmacist or health care provider about all the medicines that you take, their possible side effects, and which medicines are safe to take together.  · Make it your goal to take part in all treatment decisions (shared decision-making). Ask about possible side effects of medicines that your health care provider recommends, and tell him or her how you feel about having those side effects. It is best if shared decision-making with your health care provider is part of your total treatment plan.  If you are taking medicines as part of your treatment, do not stop taking medicines before you ask your health care provider if it is safe to stop. You may need to have the medicine slowly decreased (tapered) over time to lower the risk of harmful side effects.  Relationships  Spend time with people whom you trust and with whom you feel a sense of  understanding and calm. Try to find friends or family members who make you feel safe and can help you control feelings of gail. Consider going to couples counseling, family education classes, or family therapy to:  · Educate your loved ones about your condition and offer suggestions about how they can support you.  · Help resolve conflicts.  · Help develop communication skills in your relationships.    How to recognize changes in your condition  Everyone responds differently to treatment for bipolar disorder. Some signs that your condition is improving include:  · Leveling of your mood. You may have less anger and excitement about daily activities, and your low moods may not be as bad.  · Your symptoms being less intense.  · Feeling calm more often.  · Thinking clearly.  · Not experiencing consequences for extreme behavior.  · Feeling like your life is settling down.  · Your behavior seeming more normal to you and to other people.  Some signs that your condition may be getting worse include:  · Sleep problems.  · Moods cycling between deep lows and unusually high (excess) energy.  · Extreme emotions.  · More anger at loved ones.  · Staying away from others, or isolating yourself.  · A feeling of power or superiority.  · Completing a lot of tasks in a very short amount of time.  · Unusual thoughts and behaviors.  · Suicidal thoughts.  Follow these instructions at home:  Medicines  · Take over-the-counter and prescription medicines only as told by your health care provider or pharmacist.  · Ask your pharmacist what over-the-counter cold medicines you should avoid. Some medicines can make symptoms worse.  General instructions  · Ask for support from trusted family members or friends to make sure you stay on track with your treatment.  · Keep a journal to write down your daily moods, medicines, sleep habits, and life events. This may help you have more success with your treatment.  · Make and follow a routine for daily  meal times. Eat healthy foods, such as whole grains, vegetables, and fresh fruit.  · Try to go to sleep and wake up around the same time every day.  · Keep all follow-up visits as told by your health care provider. This is important.  Where to find support  Talking to others  · Try making a list of the people you may want to tell about your condition, such as the people you trust most.  · Plan what you are willing and not willing to talk about. Think about your needs ahead of time, and how your friends and family members can support you.  · Let your loved ones know when they can share advice and when you would just like them to listen.  · Give your loved ones information about bipolar disorder, and encourage them to learn about the condition.  Finances  Not all insurance plans cover mental health care, so it is important to check with your insurance carrier. If paying for co-pays or counseling services is a problem, search for a [facility] or [facility] mental health care center. Public mental health care services may be offered there at a low cost or no cost when you are not able to see a private health care provider.  If you are taking medicine for depression, you may be able to get the generic form, which may be less expensive than brand-name medicine. Some makers of prescription medicines also offer help to patients who cannot afford the medicines they need.  Questions to ask your health care provider:  · If you are taking medicines:  ? How long do I need to take medicine?  ? Are there any long-term side effects of my medicine?  ? Are there any alternatives to taking medicine?  · How would I benefit from therapy?  · How often should I follow up with a health care provider?  Contact a health care provider if:  · Your symptoms get worse or they do not get better with treatment.  Get help right away if:  · You have thoughts about harming yourself or others.  If you ever feel like you may hurt yourself or others, or have  thoughts about taking your own life, get help right away. You can go to your nearest emergency department or call:  · Your local emergency services (911 in the [place]  · A suicide crisis helpline, such as the National Suicide Prevention Lifeline at 1-911.648.7925. This is open 24-hours a day.  Summary  · Learning ways to manage stress can help to calm you and may also help your treatment work better.  · There is a wide range of medicines that can help to treat bipolar disorder.  · Having healthy relationships can help to make your moods more stable.  · Contact a health care provider if your symptoms get worse or they do not get better with treatment.  This information is not intended to replace advice given to you by your health care provider. Make sure you discuss any questions you have with your health care provider.  Document Revised: 06/02/2021 Document Reviewed: 06/02/2021  Elsevier Patient Education © 2021 [employer]

## 2021-09-27 ENCOUNTER — TELEPHONE (OUTPATIENT)
Dept: OBSTETRICS AND GYNECOLOGY | Facility: CLINIC | Age: 26
End: 2021-09-27

## 2021-09-27 NOTE — TELEPHONE ENCOUNTER
PT is a former Dr. Adams pt that had a miscarriage two months ago. The pt has yet to receive a menstrual cycle. Pt indicated that she is trying to get pregnant so I have sent her to one of the OBGYN offices to discuss since she OB questions. Should she transfer care to them?

## 2021-09-28 ENCOUNTER — TELEPHONE (OUTPATIENT)
Dept: OBSTETRICS AND GYNECOLOGY | Facility: CLINIC | Age: 26
End: 2021-09-28

## 2021-09-30 ENCOUNTER — APPOINTMENT (OUTPATIENT)
Dept: MRI IMAGING | Facility: HOSPITAL | Age: 26
End: 2021-09-30

## 2021-10-04 ENCOUNTER — TELEPHONE (OUTPATIENT)
Dept: NEUROLOGY | Facility: CLINIC | Age: 26
End: 2021-10-04

## 2021-10-04 NOTE — TELEPHONE ENCOUNTER
PATIENT CALLED- SHE SAID SHE DOESN'T REMEMBER DISCUSSING A NEW MEDICATION WITH [facility] BUT SHE HAS BEEN GIVEN A NEW MED- amitriptyline (ELAVIL) 25 MG tablet      PLEASE CALL PATIENT AND DISCUSS THIS CHANGE. THANKS

## 2021-10-22 ENCOUNTER — HOSPITAL ENCOUNTER (OUTPATIENT)
Dept: MRI IMAGING | Facility: HOSPITAL | Age: 26
Discharge: HOME | End: 2021-10-22
Admitting: NURSE PRACTITIONER

## 2021-10-22 DIAGNOSIS — G43.009 MIGRAINE WITHOUT AURA AND WITHOUT STATUS MIGRAINOSUS, NOT INTRACTABLE: ICD-10-CM

## 2021-10-22 PROCEDURE — 0 GADOBENATE DIMEGLUMINE 529 MG/ML SOLUTION: Performed by: NURSE PRACTITIONER

## 2021-10-22 PROCEDURE — 70553 MRI BRAIN STEM W/O & W/DYE: CPT

## 2021-10-22 PROCEDURE — A9577 INJ MULTIHANCE: HCPCS | Performed by: NURSE PRACTITIONER

## 2021-10-22 RX ADMIN — GADOBENATE DIMEGLUMINE 20 ML: 529 INJECTION, SOLUTION INTRAVENOUS at 13:25

## 2021-10-26 ENCOUNTER — TELEPHONE (OUTPATIENT)
Dept: FAMILY MEDICINE CLINIC | Facility: CLINIC | Age: 26
End: 2021-10-26

## 2021-10-26 DIAGNOSIS — G43.909 MIGRAINE WITHOUT STATUS MIGRAINOSUS, NOT INTRACTABLE, UNSPECIFIED MIGRAINE TYPE: Primary | ICD-10-CM

## 2021-10-26 PROBLEM — Z01.419 WELL WOMAN EXAM: Status: ACTIVE | Noted: 2021-10-26

## 2021-10-26 RX ORDER — RIMEGEPANT SULFATE 75 MG/75MG
75 TABLET, ORALLY DISINTEGRATING ORAL DAILY PRN
Qty: 9 TABLET | Refills: 3 | Status: SHIPPED | OUTPATIENT
Start: 2021-10-26 | End: 2021-12-20 | Stop reason: SDUPTHER

## 2021-10-28 ENCOUNTER — PRIOR AUTHORIZATION (OUTPATIENT)
Dept: FAMILY MEDICINE CLINIC | Facility: CLINIC | Age: 26
End: 2021-10-28

## 2021-10-28 ENCOUNTER — HOSPITAL ENCOUNTER (OUTPATIENT)
Dept: NUTRITION | Facility: HOSPITAL | Age: 26
Setting detail: RECURRING SERIES
Discharge: HOME | End: 2021-10-28

## 2021-10-28 ENCOUNTER — OFFICE VISIT (OUTPATIENT)
Dept: OBSTETRICS AND GYNECOLOGY | Facility: CLINIC | Age: 26
End: 2021-10-28

## 2021-10-28 VITALS
WEIGHT: 293 LBS | SYSTOLIC BLOOD PRESSURE: 122 MMHG | DIASTOLIC BLOOD PRESSURE: 78 MMHG | BODY MASS INDEX: 45.99 KG/M2 | HEIGHT: 67 IN

## 2021-10-28 VITALS — HEIGHT: 67 IN | BODY MASS INDEX: 53.2 KG/M2

## 2021-10-28 DIAGNOSIS — N91.1 SECONDARY AMENORRHEA: Primary | ICD-10-CM

## 2021-10-28 DIAGNOSIS — N93.9 ABNORMAL UTERINE BLEEDING (AUB): ICD-10-CM

## 2021-10-28 DIAGNOSIS — F31.31 BIPOLAR AFFECTIVE DISORDER, CURRENTLY DEPRESSED, MILD (HCC): ICD-10-CM

## 2021-10-28 PROCEDURE — 99213 OFFICE O/P EST LOW 20 MIN: CPT | Performed by: OBSTETRICS & GYNECOLOGY

## 2021-10-28 PROCEDURE — 97802 MEDICAL NUTRITION INDIV IN: CPT | Performed by: DIETITIAN, REGISTERED

## 2021-10-28 NOTE — PROGRESS NOTES
Subjective   Chief Complaint   Patient presents with   • Follow-up     pt c/o no period since her miscarriage three months ago. pt has questions about her current meds and a future pregnancy. pt also asking about IVF.      Karma Morrissey is a 25 y.o. year old .  No LMP recorded. (Menstrual status: Other).  She presents to be seen because of amenorrhea since miscarriage in 2021.  Patient has previously seen Dr. Adams.    Prior to her menstrual cycles in 2021 her cycles were very abnormal   She has never tracked her cycles before  When she had her menstrual cycles they used to be really heavy   She saw a provider for contraception to help regulate them and used COCPs to help with regulation - this was for about ~ 5 months.  Menarche 10 years old  Currently sexually active   Not currently using anything to prevent pregnancy   Current weight 334lbs - 4lbs weight gain after pregnancy. She reports being 300lbs prior to recent pregnancy   Overall not sure of paternity with last miscarriage.    [facility] - Dr. Castellano is who follows her regarding her bipolar disorder for which she takes amitriptyline Lexapro and Seroquel.    OTHER THINGS SHE WANTS TO DISCUSS TODAY:  Nothing else    The following portions of the patient's history were reviewed and updated as appropriate:current medications, allergies, past family history, past medical history, past social history and past surgical history    Social History    Tobacco Use      Smoking status: Never Smoker      Smokeless tobacco: Never Used    Review of Systems  Constitutional POS: nothing reported    NEG: anorexia or night sweats   Genitourinary POS: nothing reported    NEG: dysuria or hematuria   Gastointestinal POS: nothing reported    NEG: bloating, change in bowel habits, melena or reflux symptoms   Integument POS: nothing reported    NEG: moles that are changing in size, shape, color or rashes   Breast POS: nothing reported    NEG:  "persistent breast lump, skin dimpling or nipple discharge         Objective   /78   Ht 168.9 cm (66.5\")   Wt (!) 152 kg (334 lb 9.6 oz)   BMI 53.20 kg/m²     General:  well developed; well nourished  no acute distress   Skin:  Not performed.   Thyroid: not examined   Lungs:  breathing is unlabored   Heart:  Not performed.   Breasts:  Not performed.   Abdomen: Not performed.   Pelvis: Not performed.     Lab Review   HCG    Imaging   No data reviewed        Assessment   1. Secondary amenorrhea most consistent with anovulatory cycles  2. BMI 53  3. Bipolar disorder     Plan   The following tests were ordered today: see below listed labs.  It was explained to Karma that all lab test should be back within the one week after they are performed. She will be notified about the results, regardless of the findings. If she has not been contacted by the office within 2 weeks after the test has been performed, it is her responsibility to contact us to learn about her results.  Reviewed the utmost importance of good nutrition and exercise in regards to weight loss given that obesity contributes to anovulatory cycles and fertility.  Patient reports she is actively working on this  In addition I recommend that her partner get a semen analysis  Reviewed in regards to mental health treatment during pregnancy such as bipolar disorder the recommendation is to individualize care and strive for monotherapy.  Reviewed overall she can take the medication she is on but would encourage decreasing to 1 or 2 medications of a full.  She reports she will discuss this with her psychiatrist at the [facility].  Reviewed they are more than welcome to reach out to me to discuss further.  Reviewed that I would overall try to avoid the migraine medication she is on given limited data pregnancy.  Request last pap  The importance of keeping all planned follow-up and taking all medications as prescribed was emphasized.  Follow up for " recheck of above with ultrasound.  Reviewed if overall lab work is normal then recommend progesterone withdrawal test with Provera 10 mg x 10 days. Will need to do exam with pap at next visit; patient declined today     No orders of the defined types were placed in this encounter.        Orders Placed This Encounter   Procedures   • US Non-ob Transvaginal     Standing Status:   Future     Standing Expiration Date:   10/28/2022     Order Specific Question:   Reason for Exam:     Answer:   irregular menses   • TSH Rfx On Abnormal To Free T4     Standing Status:   Future     Standing Expiration Date:   10/28/2022     Order Specific Question:   Release to patient     Answer:   Immediate   • CBC (No Diff)     Standing Status:   Future     Standing Expiration Date:   10/28/2022     Order Specific Question:   Release to patient     Answer:   Immediate   • hCG, Quantitative, Pregnancy     Standing Status:   Future     Standing Expiration Date:   10/28/2022     Order Specific Question:   Release to patient     Answer:   Immediate   • Prolactin     Standing Status:   Future     Standing Expiration Date:   10/28/2022     Order Specific Question:   Release to patient     Answer:   Immediate   • Luteinizing Hormone     Standing Status:   Future     Standing Expiration Date:   10/28/2022     Order Specific Question:   Release to patient     Answer:   Immediate   • Follicle Stimulating Hormone     Standing Status:   Future     Standing Expiration Date:   10/28/2022     Order Specific Question:   Release to patient     Answer:   Immediate   • Estradiol     Standing Status:   Future     Standing Expiration Date:   10/28/2022     Order Specific Question:   Release to patient     Answer:   Immediate   • Hemoglobin A1c     Standing Status:   Future     Standing Expiration Date:   10/28/2022     Order Specific Question:   Release to patient     Answer:   Immediate   • Lipid Panel     Standing Status:   Future     Standing Expiration  Date:   10/28/2022     Order Specific Question:   Release to patient     Answer:   Immediate   • Testosterone - total     Standing Status:   Future     Standing Expiration Date:   10/28/2022     Order Specific Question:   Release to patient     Answer:   Immediate   • Testosterone Free Direct     Standing Status:   Future     Standing Expiration Date:   10/28/2022     Order Specific Question:   Release to patient     Answer:   Immediate         This note was electronically signed.    Lola Jansen MD  October 28, 2021    Note: Speech recognition transcription software may have been used to create portions of this document.  An attempt at proofreading has been made but errors in transcription could still be present.

## 2021-10-28 NOTE — CONSULTS
"Adult Outpatient Nutrition  Assessment/PES    Patient Name:  Karma Morrissey  YOB: 1995  MRN: [id number]    Assessment Date:  10/28/2021    Telephone nutrition consult, 60 minutes. This medical referred consult was provided as a telephone call, tele-health or e-visit, as patient is unable to attend an in-office appointment due to the COVID-19 crisis. Consent for treatment was given verbally.    Comments:  Patient present for nutrition counseling related to weight loss. Patient has a desire to lose weight for her health and conception. She did have a miscarriage earlier this year.  Patient describes problems with time/energy for meal preparation, and \"gorging\" when she gets home from work. 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 As a result, she will not eat until getting home after 11 pm (her shift is 3-11 pm). She then stays up until 4-5am when her fiance gets home from work, may snack on sweets. Reports she likes a variety of foods. Beverages include [employer], water, and 1-2 small cans of soda per day. Physical activity is limited to her job (walks 8-9k steps during a shift). Today patient wants to obtain information on weight loss strategies. No barriers to learning. Health literacy assessment not completed.     The instructional process includes the plate method, meal planning, and portions. Estimated energy needs for weight loss is 1,700 - 1,800 calories per day for approx. 2 lb per week weight loss. Discussed the importance of gradual changes for long-term success rather than quick, fad diets. RD recommends a more regular meal pattern to prevent binge-eating in the evening. Patient is open to incorporating protein shakes during her work shift. RD will also send handout on snack options that could be quick and easy to grab. She does feel eating at work will be difficult as the patients often want to eat her food, so " "it is easier to not eat at all. Encouraged patient to work towards at least something during her shift rather than nothing. For dinner, encouraged adding plenty of vegetables to the meal for high fiber/satiety. Discussed how no salt canned and frozen vegetables are appropriate for a convenient option. Advised she also continue protein and grains/starch, but to consume smaller portions for these foods compared to the non-starchy vegetables.     Patient states she would really like something to suppress her appetite. RD reinforced adequate nutrition during the day/not skipping meals, and suggested she could discuss medications with PCP if desired. Once patient realized I do not prescribe weight loss medications, she states she plans to call her doctor for the next steps. Declines to schedule follow up with RD.     RD will send materials to patient by mail, including [facility] Weight Loss Toolkit, 1,800 kcal meal plan and sample menu, and supporting nutrition education materials.     Total of 50 minutes spent with patient on nutrition counseling. Education based on Academy of Dietetics and Nutrition guidelines. Patient was provided with RD's contact information. Thank you for this referral.     General Info     Row Name 10/28/21 1338       Today's Session    Person(s) attending today's session Patient     Services Used Today? No       General Information    How Well Do You Speak English? very well    Do You Speak a Language Other Than English at Home? no    Preferred Language English    Are you able to read and write English? Yes    Lives With significant other    Is patient pregnant? no                 Anthropometrics     Row Name 10/28/21 8818          Anthropometrics    Height 168.9 cm (66.5\")            Ideal Body Weight (IBW)    Ideal Body Weight (IBW) (kg) 60.72                Nutritional Info/Activity     Row Name 10/28/21 5382       Nutritional Information    Have you had weight changes? Yes    " "Describe weight changes Gained 30 lbs during recent pregnancy - miscarried and has not lost weight    Supplemental Drinks/Foods/Additives MVI gummies    History of eating disorder? No    What cultural diet influences are important for you to follow? None    Do you have difficulty chewing food? No    Functional Status able to prepare meals; able to purchase food; ambulatory    List any food cravings/trigger foods you have Sweets    How often during the day do you find yourself snacking? 1 time/day    Food Behaviors Comfort eater    How often do you eat out and where? 1-2 times/week    How many times do you drink milk per day? 0    How many times do you eat fruit per day? 0    How many times do you eat vegetables per day? 1    How many times do you drink juice per day? 0    How many times do you eat candy/chocolates per day? 1    How many times do you eat baked goods per day? 0    How many times do you eat desserts per day? 0    How many times do you eat ice cream per day? 0    How many times do you eat snack foods per day? 0    How many diet sodas do you drink per day? 0    How many regular sodas do you drink per day? 2    How many times do you eat ethnic food per day? 0    How many times do you drink alcohol per day? 0    How many times do you have caffeine per day? 1    How many servings of artificial sweetner do you have per day? 0    How many meals do you eat each day? 1    How many snacks do you eat each day? 1    What is the biggest challenge you have with your diet? Other (comment)  Having energy for cooking, \"gorging\"    Enter everything you can remember eating in the last 24 hours (1 day) Dinner: Chicken and pasta       Eating Environment    Eating environment Family       Physical Activity    Are you currently involved in an activity/exercise program?  Yes    Describe physical activity activity at work (walking) - 8,000 -9,000 steps per day               Home Nutrition Report     Row Name 10/28/21 7513    " "      Home Nutrition Report    Supplemental Drinks/Foods/Additives MVI gummies                Estimated/Assessed Needs     Row Name 10/28/21 1333          Calculation Measurements    Weight Used For Calculations 151 kg (333 lb)     Height 168.9 cm (66.5\")            Estimated/Assessed Needs    Additional Documentation Cotton-St. Jeor Equation (Group)            Cotton-St. Jeor Equation    RMR (Cotton-St. Jeor Equation) 2530.1675     Cotton-St. Jeor Activity Factors 1.2     Activity Factors (Cotton-St. Jeor) 2724.201                       Problem/Interventions:   Problem 1     Row Name 10/28/21 1404          Nutrition Diagnoses Problem 1    Problem 1 Overweight/Obesity     Etiology (related to) Factors Affecting Nutrition     Food Habit/Preferences 1 Large Meal/day     Signs/Symptoms (evidenced by) BMI     BMI Greater than 40                        Intervention Goal     Row Name 10/28/21 1405          Intervention Goal    General Meet nutritional needs for age/condition     PO Meet estimated needs     Weight Appropriate weight loss                  Nutrition Prescription     Row Name 10/28/21 1405          Nutrition Prescription PO    PO Prescription Begin/change diet     Begin/Change Diet to Regular     Fluid Consistency Thin     New PO Prescription Ordered? No, recommended                Education/Evaluation     Row Name 10/28/21 1405          Education    Education Education topics; Provided education regarding; Advised regarding habits/behavior     Provided education regarding Nutrition for age     Education Topics Basic nutrition; Gradual weight loss     Advised Regarding Habits/Behavior Eating pattern; Food choices            Monitor/Evaluation    Monitor Per protocol     Education Follow-up Reinforce PRN                 Electronically signed by:  Joyce Bacon RD  10/28/21 14:33 EDT  "

## 2021-10-29 ENCOUNTER — HOSPITAL ENCOUNTER (EMERGENCY)
Facility: HOSPITAL | Age: 26
Discharge: PSYCHIATRIC OR SUBSTANCE USE FACILITY | End: 2021-10-29
Attending: STUDENT IN AN ORGANIZED HEALTH CARE EDUCATION/TRAINING PROGRAM | Admitting: STUDENT IN AN ORGANIZED HEALTH CARE EDUCATION/TRAINING PROGRAM

## 2021-10-29 VITALS
TEMPERATURE: 98.8 F | DIASTOLIC BLOOD PRESSURE: 84 MMHG | WEIGHT: 293 LBS | SYSTOLIC BLOOD PRESSURE: 128 MMHG | OXYGEN SATURATION: 98 % | BODY MASS INDEX: 47.09 KG/M2 | HEIGHT: 66 IN | HEART RATE: 86 BPM | RESPIRATION RATE: 17 BRPM

## 2021-10-29 DIAGNOSIS — R45.851 DEPRESSION WITH SUICIDAL IDEATION: ICD-10-CM

## 2021-10-29 DIAGNOSIS — F41.9 ANXIETY: ICD-10-CM

## 2021-10-29 DIAGNOSIS — F32.A DEPRESSION WITH SUICIDAL IDEATION: ICD-10-CM

## 2021-10-29 DIAGNOSIS — G43.009 MIGRAINE WITHOUT AURA AND WITHOUT STATUS MIGRAINOSUS, NOT INTRACTABLE: ICD-10-CM

## 2021-10-29 DIAGNOSIS — F33.9 EPISODE OF RECURRENT MAJOR DEPRESSIVE DISORDER, UNSPECIFIED DEPRESSION EPISODE SEVERITY (HCC): Primary | ICD-10-CM

## 2021-10-29 LAB
AMPHET+METHAMPHET UR QL: NEGATIVE
AMPHETAMINES UR QL: NEGATIVE
ANION GAP SERPL CALCULATED.3IONS-SCNC: 13 MMOL/L (ref 5–15)
APAP SERPL-MCNC: <5 MCG/ML (ref 0–30)
B-HCG UR QL: NEGATIVE
BACTERIA UR QL AUTO: ABNORMAL /HPF
BARBITURATES UR QL SCN: NEGATIVE
BASOPHILS # BLD AUTO: 0.05 10*3/MM3 (ref 0–0.2)
BASOPHILS NFR BLD AUTO: 0.5 % (ref 0–1.5)
BENZODIAZ UR QL SCN: NEGATIVE
BILIRUB UR QL STRIP: NEGATIVE
BUN SERPL-MCNC: 9 MG/DL (ref 6–20)
BUN/CREAT SERPL: 13.2 (ref 7–25)
BUPRENORPHINE SERPL-MCNC: NEGATIVE NG/ML
CALCIUM SPEC-SCNC: 9.5 MG/DL (ref 8.6–10.5)
CANNABINOIDS SERPL QL: NEGATIVE
CHLORIDE SERPL-SCNC: 105 MMOL/L (ref 98–107)
CLARITY UR: ABNORMAL
CO2 SERPL-SCNC: 21 MMOL/L (ref 22–29)
COCAINE UR QL: NEGATIVE
COLOR UR: YELLOW
CREAT SERPL-MCNC: 0.68 MG/DL (ref 0.57–1)
DEPRECATED RDW RBC AUTO: 38 FL (ref 37–54)
EOSINOPHIL # BLD AUTO: 0.22 10*3/MM3 (ref 0–0.4)
EOSINOPHIL NFR BLD AUTO: 2 % (ref 0.3–6.2)
ERYTHROCYTE [DISTWIDTH] IN BLOOD BY AUTOMATED COUNT: 12.5 % (ref 12.3–15.4)
ETHANOL BLD-MCNC: <10 MG/DL (ref 0–10)
FLUAV RNA RESP QL NAA+PROBE: NOT DETECTED
FLUBV RNA RESP QL NAA+PROBE: NOT DETECTED
GFR SERPL CREATININE-BSD FRML MDRD: 104 ML/MIN/1.73
GLUCOSE SERPL-MCNC: 117 MG/DL (ref 65–99)
GLUCOSE UR STRIP-MCNC: NEGATIVE MG/DL
HCT VFR BLD AUTO: 39.8 % (ref 34–46.6)
HGB BLD-MCNC: 12.9 G/DL (ref 12–15.9)
HGB UR QL STRIP.AUTO: ABNORMAL
HYALINE CASTS UR QL AUTO: ABNORMAL /LPF
IMM GRANULOCYTES # BLD AUTO: 0.05 10*3/MM3 (ref 0–0.05)
IMM GRANULOCYTES NFR BLD AUTO: 0.5 % (ref 0–0.5)
KETONES UR QL STRIP: ABNORMAL
LEUKOCYTE ESTERASE UR QL STRIP.AUTO: NEGATIVE
LYMPHOCYTES # BLD AUTO: 2.54 10*3/MM3 (ref 0.7–3.1)
LYMPHOCYTES NFR BLD AUTO: 23.3 % (ref 19.6–45.3)
MCH RBC QN AUTO: 27.6 PG (ref 26.6–33)
MCHC RBC AUTO-ENTMCNC: 32.4 G/DL (ref 31.5–35.7)
MCV RBC AUTO: 85 FL (ref 79–97)
METHADONE UR QL SCN: NEGATIVE
MONOCYTES # BLD AUTO: 0.85 10*3/MM3 (ref 0.1–0.9)
MONOCYTES NFR BLD AUTO: 7.8 % (ref 5–12)
NEUTROPHILS NFR BLD AUTO: 65.9 % (ref 42.7–76)
NEUTROPHILS NFR BLD AUTO: 7.17 10*3/MM3 (ref 1.7–7)
NITRITE UR QL STRIP: NEGATIVE
NRBC BLD AUTO-RTO: 0 /100 WBC (ref 0–0.2)
OPIATES UR QL: NEGATIVE
OXYCODONE UR QL SCN: NEGATIVE
PCP UR QL SCN: NEGATIVE
PH UR STRIP.AUTO: 5.5 [PH] (ref 5–8)
PLATELET # BLD AUTO: 282 10*3/MM3 (ref 140–450)
PMV BLD AUTO: 10.5 FL (ref 6–12)
POTASSIUM SERPL-SCNC: 3.7 MMOL/L (ref 3.5–5.2)
PROPOXYPH UR QL: NEGATIVE
PROT UR QL STRIP: ABNORMAL
RBC # BLD AUTO: 4.68 10*6/MM3 (ref 3.77–5.28)
RBC # UR: ABNORMAL /HPF
REF LAB TEST METHOD: ABNORMAL
SALICYLATES SERPL-MCNC: <0.3 MG/DL
SARS-COV-2 RNA RESP QL NAA+PROBE: NOT DETECTED
SODIUM SERPL-SCNC: 139 MMOL/L (ref 136–145)
SP GR UR STRIP: 1.04 (ref 1–1.03)
SQUAMOUS #/AREA URNS HPF: ABNORMAL /HPF
TRICYCLICS UR QL SCN: POSITIVE
UROBILINOGEN UR QL STRIP: ABNORMAL
WBC # BLD AUTO: 10.88 10*3/MM3 (ref 3.4–10.8)
WBC UR QL AUTO: ABNORMAL /HPF

## 2021-10-29 PROCEDURE — C9803 HOPD COVID-19 SPEC COLLECT: HCPCS

## 2021-10-29 PROCEDURE — 81001 URINALYSIS AUTO W/SCOPE: CPT | Performed by: STUDENT IN AN ORGANIZED HEALTH CARE EDUCATION/TRAINING PROGRAM

## 2021-10-29 PROCEDURE — 85025 COMPLETE CBC W/AUTO DIFF WBC: CPT | Performed by: STUDENT IN AN ORGANIZED HEALTH CARE EDUCATION/TRAINING PROGRAM

## 2021-10-29 PROCEDURE — 80048 BASIC METABOLIC PNL TOTAL CA: CPT | Performed by: STUDENT IN AN ORGANIZED HEALTH CARE EDUCATION/TRAINING PROGRAM

## 2021-10-29 PROCEDURE — 80306 DRUG TEST PRSMV INSTRMNT: CPT | Performed by: STUDENT IN AN ORGANIZED HEALTH CARE EDUCATION/TRAINING PROGRAM

## 2021-10-29 PROCEDURE — 93005 ELECTROCARDIOGRAM TRACING: CPT | Performed by: STUDENT IN AN ORGANIZED HEALTH CARE EDUCATION/TRAINING PROGRAM

## 2021-10-29 PROCEDURE — 99285 EMERGENCY DEPT VISIT HI MDM: CPT

## 2021-10-29 PROCEDURE — 87636 SARSCOV2 & INF A&B AMP PRB: CPT | Performed by: STUDENT IN AN ORGANIZED HEALTH CARE EDUCATION/TRAINING PROGRAM

## 2021-10-29 PROCEDURE — 81025 URINE PREGNANCY TEST: CPT | Performed by: STUDENT IN AN ORGANIZED HEALTH CARE EDUCATION/TRAINING PROGRAM

## 2021-10-29 PROCEDURE — 80179 DRUG ASSAY SALICYLATE: CPT | Performed by: STUDENT IN AN ORGANIZED HEALTH CARE EDUCATION/TRAINING PROGRAM

## 2021-10-29 PROCEDURE — 80143 DRUG ASSAY ACETAMINOPHEN: CPT | Performed by: STUDENT IN AN ORGANIZED HEALTH CARE EDUCATION/TRAINING PROGRAM

## 2021-10-29 PROCEDURE — 82077 ASSAY SPEC XCP UR&BREATH IA: CPT | Performed by: STUDENT IN AN ORGANIZED HEALTH CARE EDUCATION/TRAINING PROGRAM

## 2021-10-31 LAB
QT INTERVAL: 352 MS
QTC INTERVAL: 474 MS

## 2021-11-01 RX ORDER — AMITRIPTYLINE HYDROCHLORIDE 25 MG/1
TABLET, FILM COATED ORAL
Qty: 30 TABLET | Refills: 2 | Status: SHIPPED | OUTPATIENT
Start: 2021-11-01 | End: 2022-02-04 | Stop reason: SDUPTHER

## 2021-11-01 NOTE — TELEPHONE ENCOUNTER
Rx Refill Note  Requested Prescriptions     Pending Prescriptions Disp Refills   • amitriptyline (ELAVIL) 25 MG tablet [Pharmacy Med Name: AMITRIPTYLINE HCL 25 MG TAB] 30 tablet 1     Sig: TAKE ONE TABLET BY MOUTH ONCE NIGHTLY      Last office visit with prescribing clinician: 9/3/2021      Next office visit with prescribing clinician: Visit date not found   }Anabel Neville MA  11/01/21, 09:16 EDT

## 2021-11-22 ENCOUNTER — OFFICE VISIT (OUTPATIENT)
Dept: OBSTETRICS AND GYNECOLOGY | Facility: CLINIC | Age: 26
End: 2021-11-22

## 2021-11-22 VITALS
WEIGHT: 293 LBS | HEIGHT: 66 IN | SYSTOLIC BLOOD PRESSURE: 100 MMHG | DIASTOLIC BLOOD PRESSURE: 80 MMHG | BODY MASS INDEX: 47.09 KG/M2

## 2021-11-22 DIAGNOSIS — E28.2 PCOS (POLYCYSTIC OVARIAN SYNDROME): ICD-10-CM

## 2021-11-22 DIAGNOSIS — N91.1 SECONDARY AMENORRHEA: Primary | ICD-10-CM

## 2021-11-22 PROCEDURE — 99213 OFFICE O/P EST LOW 20 MIN: CPT | Performed by: OBSTETRICS & GYNECOLOGY

## 2021-11-22 RX ORDER — MEDROXYPROGESTERONE ACETATE 10 MG/1
10 TABLET ORAL DAILY
Qty: 10 TABLET | Refills: 0 | Status: SHIPPED | OUTPATIENT
Start: 2021-11-22 | End: 2021-11-29

## 2021-11-22 NOTE — PROGRESS NOTES
"Subjective   Chief Complaint   Patient presents with   • Amenorrhea     follow up      Karma Morrissey is a 26 y.o. year old  who comes to review her recent testing and discuss next steps.  She unfortunately did not have her blood work done after last visit.  She had an ultrasound done today.  She reports she still has not had a menstrual cycle since after her miscarriage in July of this year.  She reports she is concerned that her current sexual partner cannot give people pregnant.  She thinks the partner from her miscarriage was a different partner.  She has questions about assistance with fertility.    OTHER THINGS SHE WANTS TO DISCUSS TODAY:  Nothing else       Objective   /80   Ht 168.9 cm (66.5\")   Wt (!) 151 kg (332 lb 12.8 oz)   BMI 52.92 kg/m²     Lab Review   No data reviewed    Imaging   Pelvic ultrasound report       Assessment   1. Secondary amenorrhea most consistent with PCOS/anovulatory cycles      Plan   1. Reviewed the importance of having previous blood work performed.  2. Reviewed overall normal pelvic ultrasound today.  3. Reviewed my recommendation to take Provera to help induce a withdrawal bleed.  4. Reviewed again the importance of diet and exercise in regards to weight loss with her anovulatory cycles  5. Recommend that her partner get a semen analysis a number to the infertility office here provided.  6. Reviewed if she and partner desire to get pregnant I recommend results from semen analysis first before starting ovulation induction medications.  Patient verbalized understanding  7. The importance of keeping all planned follow-up and taking all medications as prescribed was emphasized.  8. Follow up for annual exam and for recheck of above in ~ 2  weeks    New Medications Ordered This Visit   Medications   • medroxyPROGESTERone (Provera) 10 MG tablet     Sig: Take 1 tablet by mouth Daily for 10 days.     Dispense:  10 tablet     Refill:  0          Total time spent today " with Karma  was 20-29 minutes (level 3).  Greater than 50% of the time was spent face-to-face coordinating care, answering her questions and counseling regarding pathophysiology of her presenting problem along with plans for any diagnositc work-up and treatment.    This note was electronically signed.    Lola Jansen MD  November 22, 2021    Note: Speech recognition transcription software may have been used to create portions of this document.  An attempt at proofreading has been made but errors in transcription could still be present.   no

## 2021-11-29 RX ORDER — MEDROXYPROGESTERONE ACETATE 10 MG/1
TABLET ORAL
Qty: 10 TABLET | Refills: 0 | Status: SHIPPED | OUTPATIENT
Start: 2021-11-29

## 2021-12-20 DIAGNOSIS — G43.909 MIGRAINE WITHOUT STATUS MIGRAINOSUS, NOT INTRACTABLE, UNSPECIFIED MIGRAINE TYPE: ICD-10-CM

## 2021-12-20 DIAGNOSIS — R51.9 NONINTRACTABLE HEADACHE, UNSPECIFIED CHRONICITY PATTERN, UNSPECIFIED HEADACHE TYPE: ICD-10-CM

## 2021-12-20 RX ORDER — IBUPROFEN 800 MG/1
800 TABLET ORAL EVERY 8 HOURS PRN
Qty: 90 TABLET | Refills: 0 | Status: SHIPPED | OUTPATIENT
Start: 2021-12-20 | End: 2022-01-17

## 2021-12-20 RX ORDER — RIMEGEPANT SULFATE 75 MG/75MG
75 TABLET, ORALLY DISINTEGRATING ORAL DAILY PRN
Qty: 9 TABLET | Refills: 3 | Status: SHIPPED | OUTPATIENT
Start: 2021-12-20 | End: 2022-03-04 | Stop reason: SDUPTHER

## 2021-12-20 NOTE — TELEPHONE ENCOUNTER
Caller: Karma Morrissey    Relationship: Self    Best call back number: [phone]    Requested Prescriptions:   Requested Prescriptions     Pending Prescriptions Disp Refills   • Rimegepant Sulfate (Nurtec) 75 MG tablet dispersible tablet 9 tablet 3     Sig: Take 1 tablet by mouth Daily As Needed (migraine).   • ibuprofen (ADVIL,MOTRIN) 800 MG tablet 90 tablet 0     Sig: Take 1 tablet by mouth Every 8 (Eight) Hours As Needed for Mild Pain .        Pharmacy where request should be sent: [facility] [facility] [address] - [phone]  - [phone]     Additional details provided by patient: NONE    Does the patient have less than a 3 day supply:  [] Yes  [] No    Yohana Pastor, PCT   12/20/21 15:36 EST       PATIENT WANTS TO KNOW IF SHE NEEDS AN APPOINTMENT FOR A PREGNANCY TEST.

## 2022-01-16 DIAGNOSIS — R51.9 NONINTRACTABLE HEADACHE, UNSPECIFIED CHRONICITY PATTERN, UNSPECIFIED HEADACHE TYPE: ICD-10-CM

## 2022-01-17 RX ORDER — IBUPROFEN 800 MG/1
TABLET ORAL
Qty: 90 TABLET | Refills: 0 | Status: SHIPPED | OUTPATIENT
Start: 2022-01-17

## 2022-02-04 DIAGNOSIS — G43.009 MIGRAINE WITHOUT AURA AND WITHOUT STATUS MIGRAINOSUS, NOT INTRACTABLE: ICD-10-CM

## 2022-02-04 RX ORDER — AMITRIPTYLINE HYDROCHLORIDE 25 MG/1
25 TABLET, FILM COATED ORAL NIGHTLY
Qty: 30 TABLET | Refills: 1 | Status: SHIPPED | OUTPATIENT
Start: 2022-02-04

## 2022-02-16 ENCOUNTER — TELEPHONE (OUTPATIENT)
Dept: FAMILY MEDICINE CLINIC | Facility: CLINIC | Age: 27
End: 2022-02-16

## 2022-02-28 ENCOUNTER — TELEPHONE (OUTPATIENT)
Dept: FAMILY MEDICINE CLINIC | Facility: CLINIC | Age: 27
End: 2022-02-28

## 2022-02-28 DIAGNOSIS — G43.909 MIGRAINE WITHOUT STATUS MIGRAINOSUS, NOT INTRACTABLE, UNSPECIFIED MIGRAINE TYPE: ICD-10-CM

## 2022-02-28 RX ORDER — RIMEGEPANT SULFATE 75 MG/75MG
75 TABLET, ORALLY DISINTEGRATING ORAL DAILY PRN
Qty: 9 TABLET | Refills: 3 | Status: CANCELLED | OUTPATIENT
Start: 2022-02-28

## 2022-02-28 NOTE — TELEPHONE ENCOUNTER
Caller:  JULIANNE WITH [facility] [phone]    Relationship: PHARMACY    Best call back number: [phone] REFERENCE NUMBER: [id number]  Requested Prescriptions:   Requested Prescriptions     Pending Prescriptions Disp Refills   • Rimegepant Sulfate (Nurtec) 75 MG tablet dispersible tablet 9 tablet 3     Sig: Take 1 tablet by mouth Daily As Needed (migraine).        [facility] - [address] [place] [address] - [phone] [place] [phone]      Additional details provided by patient: PLEASE CALL TO RESUBMIT THAT WAS DENIED BY INSURANCE [phone] REFERENCE NUMBER: [id number]      Lory Mi Rep   02/28/22 16:42 EST

## 2022-03-03 ENCOUNTER — PRIOR AUTHORIZATION (OUTPATIENT)
Dept: FAMILY MEDICINE CLINIC | Facility: CLINIC | Age: 27
End: 2022-03-03

## 2022-03-04 DIAGNOSIS — G43.909 MIGRAINE WITHOUT STATUS MIGRAINOSUS, NOT INTRACTABLE, UNSPECIFIED MIGRAINE TYPE: ICD-10-CM

## 2022-03-04 RX ORDER — RIMEGEPANT SULFATE 75 MG/75MG
75 TABLET, ORALLY DISINTEGRATING ORAL DAILY PRN
Qty: 9 TABLET | Refills: 5 | Status: SHIPPED | OUTPATIENT
Start: 2022-03-04

## 2023-02-23 ENCOUNTER — PRIOR AUTHORIZATION (OUTPATIENT)
Dept: FAMILY MEDICINE CLINIC | Facility: CLINIC | Age: 28
End: 2023-02-23
Payer: COMMERCIAL

## 2023-02-23 NOTE — TELEPHONE ENCOUNTER
PRIOR AUTH STARTED   02/23/2023    Key: [id number] -   PA Case ID: [id number]    Drug  Nurtec 75MG dispersible tablets